# Patient Record
Sex: FEMALE | Race: WHITE | NOT HISPANIC OR LATINO | Employment: FULL TIME | URBAN - METROPOLITAN AREA
[De-identification: names, ages, dates, MRNs, and addresses within clinical notes are randomized per-mention and may not be internally consistent; named-entity substitution may affect disease eponyms.]

---

## 2017-02-20 ENCOUNTER — OFFICE VISIT (OUTPATIENT)
Dept: URGENT CARE | Facility: PHYSICIAN GROUP | Age: 22
End: 2017-02-20
Payer: COMMERCIAL

## 2017-02-20 ENCOUNTER — HOSPITAL ENCOUNTER (OUTPATIENT)
Facility: MEDICAL CENTER | Age: 22
End: 2017-02-20
Attending: PHYSICIAN ASSISTANT
Payer: COMMERCIAL

## 2017-02-20 VITALS
DIASTOLIC BLOOD PRESSURE: 70 MMHG | WEIGHT: 152 LBS | HEART RATE: 70 BPM | SYSTOLIC BLOOD PRESSURE: 116 MMHG | RESPIRATION RATE: 18 BRPM | TEMPERATURE: 98.8 F | OXYGEN SATURATION: 97 %

## 2017-02-20 DIAGNOSIS — J06.9 VIRAL UPPER RESPIRATORY TRACT INFECTION WITH COUGH: ICD-10-CM

## 2017-02-20 DIAGNOSIS — J02.9 SORE THROAT: ICD-10-CM

## 2017-02-20 LAB
INT CON NEG: NORMAL
INT CON POS: NORMAL
S PYO AG THROAT QL: NORMAL

## 2017-02-20 PROCEDURE — 87880 STREP A ASSAY W/OPTIC: CPT | Performed by: PHYSICIAN ASSISTANT

## 2017-02-20 PROCEDURE — 87070 CULTURE OTHR SPECIMN AEROBIC: CPT

## 2017-02-20 PROCEDURE — 99214 OFFICE O/P EST MOD 30 MIN: CPT | Performed by: PHYSICIAN ASSISTANT

## 2017-02-20 RX ORDER — BENZONATATE 100 MG/1
200 CAPSULE ORAL 3 TIMES DAILY PRN
Qty: 30 CAP | Refills: 0 | Status: SHIPPED | OUTPATIENT
Start: 2017-02-20 | End: 2017-03-06

## 2017-02-20 RX ORDER — IBUPROFEN 200 MG
200 TABLET ORAL EVERY 6 HOURS PRN
COMMUNITY

## 2017-02-20 ASSESSMENT — ENCOUNTER SYMPTOMS
MUSCULOSKELETAL NEGATIVE: 1
PSYCHIATRIC NEGATIVE: 1
HEADACHES: 1
NAUSEA: 1
COUGH: 0
CARDIOVASCULAR NEGATIVE: 1
DIZZINESS: 0
SORE THROAT: 1

## 2017-02-20 NOTE — PROGRESS NOTES
Subjective:      Jackelyn BENAVIDEZ is a 21 y.o. female who presents with Pharyngitis            Pharyngitis   Associated symptoms include headaches. Pertinent negatives include no coughing.     Chief Complaint   Patient presents with   • Pharyngitis     x2 days no voice       HPI:  Jackelyn BENAVIDEZ is a 21 y.o. female who presents with sore throat and loss of voice x 2 days.  Friend had cough.  No runny nose.  Slight dry cough.  Very painful to swallow.  No fever.  Has tried ibuprofen last night.  She had a HA last night.  Has been very tired.  Patient denies SOB, chest pain, palpitations, fever, chills, or v/d.      No past medical history on file.    No past surgical history on file.    No family history on file.    Social History     Social History   • Marital Status:      Spouse Name: N/A   • Number of Children: N/A   • Years of Education: N/A     Occupational History   • Not on file.     Social History Main Topics   • Smoking status: Not on file   • Smokeless tobacco: Not on file   • Alcohol Use: Not on file   • Drug Use: Not on file   • Sexual Activity: Not on file     Other Topics Concern   • Not on file     Social History Narrative   • No narrative on file         Current outpatient prescriptions:   •  ibuprofen, 200 mg, Oral, Q6HRS PRN    No Known Allergies         Review of Systems   Constitutional: Positive for malaise/fatigue.   HENT: Positive for sore throat.    Respiratory: Negative for cough.    Cardiovascular: Negative.    Gastrointestinal: Positive for nausea.   Genitourinary: Negative.    Musculoskeletal: Negative.    Skin: Negative.    Neurological: Positive for headaches. Negative for dizziness.   Endo/Heme/Allergies: Negative.    Psychiatric/Behavioral: Negative.           Objective:     /70 mmHg  Pulse 70  Temp(Src) 37.1 °C (98.8 °F)  Resp 18  Wt 68.947 kg (152 lb)  SpO2 97%     Physical Exam       Nursing note and Vitals Reviewed.    Constitutional:   Appropriately groomed,  pleasant affect, well nourished, in NAD.    Head:   Normocephalic, atraumatic.    Eyes:   PERRLA, EOM's full, sclera white, conjunctiva not erythematous, and medial canthus without exudate bilaterally.    Ears:  Auricle and tragus non-tender to manipulation.  No pre-auricular lymphadenopathy or mastoid ttp.  EACs with mild cerumen bilaterally, not erythematous.  TM’s pearly gray with cone of light present and umbo and malleolus visible bilaterally.  No bulging or fluid bubbles present in middle ear.  Hearing grossly intact to voice.    Nose:  Nares patent bilaterally.  Nasal mucosa edematous with white rhinorrhea bilaterally. Sinuses not tender to percussion.    Throat:  Dentition wnl, mucosa moist without lesions.  Oropharynx significantly erythematous, with enlargement of the palatine tonsils bilaterally without exudates.    Tonsillith present right tonsil. Mild post nasal drainage present.  Soft palate rises symmetrically bilaterally and uvula midline.      Neck: Neck supple, with moderate anterior lymphadenopathy that is soft and mobile to palpation. Thyroid non-palpable without tenderness or nodules. No supraclavicular lymphadenopathy.    Lungs:  Respiratory effort not labored without accessory muscle use.  Lungs clear to auscultation bilaterally without wheezes, rales, or rhonchi.    Heart:  RRR, without murmurs rubs or gallops.  Radial and dorsalis pedis pulse 2+ bilaterally.  No LE edema.    Musculoskeletal:  Gait non-antalgic with a narrow base.    Derm:  Skin without rashes or lesions with good turgor pressure.      Psychiatric:  Mood, affect, and judgement appropriate.        Assessment/Plan:     1. Sore throat    - POCT Rapid Strep A  - benzonatate (TESSALON) 100 MG Cap; Take 2 Caps by mouth 3 times a day as needed for Cough for up to 14 days.  Dispense: 30 Cap; Refill: 0  - CULTURE THROAT; Future    2. Viral upper respiratory tract infection with cough  Patient presents with suspected viral upper  respiratory infection ×2 days with non-productive cough, mild coryza, and erythematous oropharynx. Rapid strep negative. Backup culture obtained to rule out group B strep. Plan to treat accordingly based off results. We'll call patient with results. Prescribed Tessalon Perles. Recommended nasal saline irrigation and licorice containing teas for sore throat. Work note provided.    Patient was in agreement with this treatment plan and seemed to understand without barriers. All questions were encouraged and answered.  Reviewed signs and symptoms of when to seek emergency medical care.     Please note that this dictation was created using voice recognition software.  I have made every reasonable attempt to correct obvious errors, but I expect there are errors of marleen and possibly content that I did not discover before finalizing the note.     - benzonatate (TESSALON) 100 MG Cap; Take 2 Caps by mouth 3 times a day as needed for Cough for up to 14 days.  Dispense: 30 Cap; Refill: 0  - CULTURE THROAT; Future

## 2017-02-20 NOTE — MR AVS SNAPSHOT
Jackelyn BENAVIDEZ   2017 8:15 AM   Office Visit   MRN: [id number]    Department:  [facility]   Dept Phone:  [phone]    Description:  Female : 1995   Provider:  Dustin Maria PA-C           Reason for Visit     Pharyngitis x2 days no voice      Allergies as of 2017     No Known Allergies      You were diagnosed with     Sore throat   [364810]       Viral upper respiratory tract infection with cough   [897862]         Vital Signs     Blood Pressure Pulse Temperature Respirations Weight Oxygen Saturation    116/70 mmHg 70 37.1 °C (98.8 °F) 18 68.947 kg (152 lb) 97%      Basic Information     Date Of Birth Sex Race Ethnicity Preferred Language    1995 Female White Non- English      Health Maintenance        Date Due Completion Dates    IMM HEP B VACCINE (1 of 3 - Primary Series) 1995 ---    IMM HEP A VACCINE (1 of 2 - Standard Series) 10/17/1998 ---    IMM HPV VACCINE (1 of 3 - Female 3 Dose Series) 10/17/2008 ---    IMM VARICELLA (CHICKENPOX) VACCINE (1 of 2 - 2 Dose Adolescent Series) 10/17/2010 ---    IMM MENINGOCOCCAL VACCINE (MCV4) (1 of 1) 10/17/2013 ---    IMM INFLUENZA (1) 2016 ---    IMM DTaP/Tdap/Td Vaccine (1 - Tdap) 10/17/2016 ---            Results     POCT Rapid Strep A      Component    Rapid Strep Screen    NEG    Internal Control Positive    Valid    Internal Control Negative    Valid                        Current Immunizations     No immunizations on file.      Below and/or attached are the medications your provider expects you to take. Review all of your home medications and newly ordered medications with your provider and/or pharmacist. Follow medication instructions as directed by your provider and/or pharmacist. Please keep your medication list with you and share with your provider. Update the information when medications are discontinued, doses are changed, or new medications (including over-the-counter products) are added; and carry  medication information at all times in the event of emergency situations     Allergies:  No Known Allergies          Medications  Valid as of: February 20, 2017 -  8:59 AM    Generic Name Brand Name Tablet Size Instructions for use    Benzonatate (Cap) TESSALON 100 MG Take 2 Caps by mouth 3 times a day as needed for Cough for up to 14 days.        Ibuprofen (Tab) MOTRIN 200 MG Take 200 mg by mouth every 6 hours as needed.        .                 Medicines prescribed today were sent to:     [facility], [place] - [address]    [address] [place] [address]    Phone: [phone] Fax: [phone]    Open 24 Hours?: No    [facility], [place] - [address]    [address] [address]    Phone: [phone] Fax: [phone]    Open 24 Hours?: Yes      Medication refill instructions:       If your prescription bottle indicates you have medication refills left, it is not necessary to call your provider’s office. Please contact your pharmacy and they will refill your medication.    If your prescription bottle indicates you do not have any refills left, you may request refills at any time through one of the following ways: The online [employer] system (except Urgent Care), by calling your provider’s office, or by asking your pharmacy to contact your provider’s office with a refill request. Medication refills are processed only during regular business hours and may not be available until the next business day. Your provider may request additional information or to have a follow-up visit with you prior to refilling your medication.   *Please Note: Medication refills are assigned a new Rx number when refilled electronically. Your pharmacy may indicate that no refills were authorized even though a new prescription for the same medication is available at the pharmacy. Please request the medicine by name with the pharmacy before  contacting your provider for a refill.        Your To Do List     Future Labs/Procedures Complete By Expires    CULTURE THROAT  As directed 2/20/2018      Instructions    Yogi: licorice + mint to help with sore throat.  Continue with ibuprofen with food, 400mg every 6 hours.  Tessalon perles for cough.  Salt water gargles.  Nasal irrigation: Ocean or Tahir med sinus rinse.  Return if worsening: fever of 101 or higher, thick yellow mucous, worsening cough.          Hubba Access Code: [email or web address]  Expires: 3/22/2017  8:59 AM    Your email address is not on file at [facility].  Email Addresses are required for you to sign up for Hubba, please contact [phone] to verify your personal information and to provide your email address prior to attempting to register for Hubba.    Jackelyn BENAVIDEZ  [address] [place] [address]    Hubba  A secure, online tool to manage your health information     [facility]’s Hubba® is a secure, online tool that connects you to your personalized health information from the privacy of your home -- day or night - making it very easy for you to manage your healthcare. Once the activation process is completed, you can even access your medical information using the Hubba emre, which is available for free in the Apple Emre store or Google Play store.     To learn more about Hubba, visit [email or web address]    There are two levels of access available (as shown below):   My Chart Features  [facility] Primary Care Doctor [facility]  Specialists [facility]  Urgent  Care Non-[facility] Primary Care Doctor   Email your healthcare team securely and privately 24/7 X X X    Manage appointments: schedule your next appointment; view details of past/upcoming appointments X      Request prescription refills. X      View recent personal medical records, including lab and immunizations X X X X   View health record, including health history, allergies, medications X X X X   Read reports about your  outpatient visits, procedures, consult and ER notes X X X X   See your discharge summary, which is a recap of your hospital and/or ER visit that includes your diagnosis, lab results, and care plan X X  X     How to register for Untangle:  Once your e-mail address has been verified, follow the following steps to sign up for Untangle.     1. Go to  [email or web address]  2. Click on the Sign Up Now box, which takes you to the New Member Sign Up page. You will need to provide the following information:  a. Enter your Untangle Access Code exactly as it appears at the top of this page. (You will not need to use this code after you’ve completed the sign-up process. If you do not sign up before the expiration date, you must request a new code.)   b. Enter your date of birth.   c. Enter your home email address.   d. Click Submit, and follow the next screen’s instructions.  3. Create a Untangle ID. This will be your Untangle login ID and cannot be changed, so think of one that is secure and easy to remember.  4. Create a [employer] password. You can change your password at any time.  5. Enter your Password Reset Question and Answer. This can be used at a later time if you forget your password.   6. Enter your e-mail address. This allows you to receive e-mail notifications when new information is available in Untangle.  7. Click Sign Up. You can now view your health information.    For assistance activating your Untangle account, call [phone]

## 2017-02-20 NOTE — Clinical Note
February 20, 2017         Patient: Jackelyn BENAVIDEZ   YOB: 1995   Date of Visit: 2/20/2017           To Whom it May Concern:    Jackelyn BENAVIDEZ was seen in my clinic on 2/20/2017. Please excuse her from work today.    If you have any questions or concerns, please don't hesitate to call.        Sincerely,           Dustin Maria PA-C  Electronically Signed

## 2017-02-20 NOTE — PATIENT INSTRUCTIONS
Yogi: licorice + mint to help with sore throat.  Continue with ibuprofen with food, 400mg every 6 hours.  Tessalon perles for cough.  Salt water gargles.  Nasal irrigation: Ocean or Tahir med sinus rinse.  Return if worsening: fever of 101 or higher, thick yellow mucous, worsening cough.

## 2017-02-21 DIAGNOSIS — J06.9 VIRAL UPPER RESPIRATORY TRACT INFECTION WITH COUGH: ICD-10-CM

## 2017-02-21 DIAGNOSIS — J02.9 SORE THROAT: ICD-10-CM

## 2017-02-21 LAB
AMBIGUOUS DTTM AMBI4: NORMAL
SIGNIFICANT IND 70042: NORMAL
SOURCE SOURCE: NORMAL

## 2017-02-23 LAB
BACTERIA SPEC RESP CULT: NORMAL
SIGNIFICANT IND 70042: NORMAL
SOURCE SOURCE: NORMAL

## 2017-02-27 ENCOUNTER — TELEPHONE (OUTPATIENT)
Dept: URGENT CARE | Facility: CLINIC | Age: 22
End: 2017-02-27

## 2017-02-27 NOTE — TELEPHONE ENCOUNTER
Spoke to patient.  Informed her of negative throat culture.  Patient is doing better.  Sore throat resolved by Friday last week.  Now with clear rhinorrhea that is managed with OTC cough medicines.  All questions encouraged and answered.

## 2017-05-09 ENCOUNTER — OFFICE VISIT (OUTPATIENT)
Dept: URGENT CARE | Facility: PHYSICIAN GROUP | Age: 22
End: 2017-05-09
Payer: COMMERCIAL

## 2017-05-09 VITALS
RESPIRATION RATE: 18 BRPM | TEMPERATURE: 98.4 F | WEIGHT: 150 LBS | DIASTOLIC BLOOD PRESSURE: 70 MMHG | OXYGEN SATURATION: 98 % | HEART RATE: 76 BPM | SYSTOLIC BLOOD PRESSURE: 112 MMHG

## 2017-05-09 DIAGNOSIS — J02.9 EXUDATIVE PHARYNGITIS: Primary | ICD-10-CM

## 2017-05-09 LAB
INT CON NEG: NEGATIVE
INT CON POS: POSITIVE
S PYO AG THROAT QL: NEGATIVE

## 2017-05-09 PROCEDURE — 87880 STREP A ASSAY W/OPTIC: CPT | Performed by: PHYSICIAN ASSISTANT

## 2017-05-09 PROCEDURE — 99214 OFFICE O/P EST MOD 30 MIN: CPT | Performed by: PHYSICIAN ASSISTANT

## 2017-05-09 RX ORDER — AMOXICILLIN AND CLAVULANATE POTASSIUM 875; 125 MG/1; MG/1
1 TABLET, FILM COATED ORAL 2 TIMES DAILY
Qty: 14 TAB | Refills: 0 | Status: SHIPPED | OUTPATIENT
Start: 2017-05-09 | End: 2017-05-16

## 2017-05-09 RX ORDER — DROSPIRENONE AND ETHINYL ESTRADIOL 0.03MG-3MG
1 KIT ORAL DAILY
COMMUNITY
End: 2020-09-27

## 2017-05-09 NOTE — PATIENT INSTRUCTIONS
"Strep Throat  Strep throat is an infection of the throat caused by a bacteria named Streptococcus pyogenes. Your health care provider may call the infection streptococcal \"tonsillitis\" or \"pharyngitis\" depending on whether there are signs of inflammation in the tonsils or back of the throat. Strep throat is most common in children aged 5-15 years during the cold months of the year, but it can occur in people of any age during any season. This infection is spread from person to person (contagious) through coughing, sneezing, or other close contact.  SIGNS AND SYMPTOMS   · Fever or chills.  · Painful, swollen, red tonsils or throat.  · Pain or difficulty when swallowing.  · White or yellow spots on the tonsils or throat.  · Swollen, tender lymph nodes or \"glands\" of the neck or under the jaw.  · Red rash all over the body (rare).  DIAGNOSIS   Many different infections can cause the same symptoms. A test must be done to confirm the diagnosis so the right treatment can be given. A \"rapid strep test\" can help your health care provider make the diagnosis in a few minutes. If this test is not available, a light swab of the infected area can be used for a throat culture test. If a throat culture test is done, results are usually available in a day or two.  TREATMENT   Strep throat is treated with antibiotic medicine.  HOME CARE INSTRUCTIONS   · Gargle with 1 tsp of salt in 1 cup of warm water, 3-4 times per day or as needed for comfort.  · Family members who also have a sore throat or fever should be tested for strep throat and treated with antibiotics if they have the strep infection.  · Make sure everyone in your household washes their hands well.  · Do not share food, drinking cups, or personal items that could cause the infection to spread to others.  · You may need to eat a soft food diet until your sore throat gets better.  · Drink enough water and fluids to keep your urine clear or pale yellow. This will help prevent " dehydration.  · Get plenty of rest.  · Stay home from school, day care, or work until you have been on antibiotics for 24 hours.  · Take medicines only as directed by your health care provider.  · Take your antibiotic medicine as directed by your health care provider. Finish it even if you start to feel better.  SEEK MEDICAL CARE IF:   · The glands in your neck continue to enlarge.  · You develop a rash, cough, or earache.  · You cough up green, yellow-brown, or bloody sputum.  · You have pain or discomfort not controlled by medicines.  · Your problems seem to be getting worse rather than better.  · You have a fever.  SEEK IMMEDIATE MEDICAL CARE IF:   · You develop any new symptoms such as vomiting, severe headache, stiff or painful neck, chest pain, shortness of breath, or trouble swallowing.  · You develop severe throat pain, drooling, or changes in your voice.  · You develop swelling of the neck, or the skin on the neck becomes red and tender.  · You develop signs of dehydration, such as fatigue, dry mouth, and decreased urination.  · You become increasingly sleepy, or you cannot wake up completely.  MAKE SURE YOU:  · Understand these instructions.  · Will watch your condition.  · Will get help right away if you are not doing well or get worse.     This information is not intended to replace advice given to you by your health care provider. Make sure you discuss any questions you have with your health care provider.     Document Released: 12/15/2001 Document Revised: 01/08/2016 Document Reviewed: 04/11/2016  [employer] Interactive Patient Education ©2016 [employer]

## 2017-05-09 NOTE — MR AVS SNAPSHOT
Jackelyn BESTLAKSHMI   2017 10:45 AM   Office Visit   MRN: [id number]    Department:  [facility]   Dept Phone:  [phone]    Description:  Female : 1995   Provider:  Hieu Lazo PA-C           Reason for Visit     Pharyngitis x1day      Allergies as of 2017     No Known Allergies      You were diagnosed with     Exudative pharyngitis   [[id number]]  -  Primary       Vital Signs     Blood Pressure Pulse Temperature Respirations Weight Oxygen Saturation    112/70 mmHg 76 36.9 °C (98.4 °F) 18 68.04 kg (150 lb) 98%    Smoking Status                   Never Smoker            Basic Information     Date Of Birth Sex Race Ethnicity Preferred Language    1995 Female White Non- English      Health Maintenance        Date Due Completion Dates    IMM HEP B VACCINE (1 of 3 - Primary Series) 1995 ---    IMM HEP A VACCINE (1 of 2 - Standard Series) 10/17/1998 ---    IMM HPV VACCINE (1 of 3 - Female 3 Dose Series) 10/17/2008 ---    IMM VARICELLA (CHICKENPOX) VACCINE (1 of 2 - 2 Dose Adolescent Series) 10/17/2010 ---    IMM MENINGOCOCCAL VACCINE (MCV4) (1 of 1) 10/17/2013 ---    IMM DTaP/Tdap/Td Vaccine (1 - Tdap) 10/17/2016 ---            Results     POCT Rapid Strep A      Component    Rapid Strep Screen    Negative    Internal Control Positive    Positive    Internal Control Negative    Negative                        Current Immunizations     No immunizations on file.      Below and/or attached are the medications your provider expects you to take. Review all of your home medications and newly ordered medications with your provider and/or pharmacist. Follow medication instructions as directed by your provider and/or pharmacist. Please keep your medication list with you and share with your provider. Update the information when medications are discontinued, doses are changed, or new medications (including over-the-counter products) are added; and carry medication information at  all times in the event of emergency situations     Allergies:  No Known Allergies          Medications  Valid as of: May 09, 2017 - 12:12 PM    Generic Name Brand Name Tablet Size Instructions for use    Alum & Mag Hydroxide-Simeth (MAALOX PLUS-BENADRYL-XYLOCAINE) maalox plus-benadryl-xylocaine  Take 15 mL by mouth every 6 hours as needed for up to 5 days.        Amoxicillin-Pot Clavulanate (Tab) AUGMENTIN 875-125 MG Take 1 Tab by mouth 2 times a day for 7 days.        Drospirenone-Ethinyl Estradiol (Tab) MASON 3-0.03 MG Take 1 Tab by mouth every day.        Ibuprofen (Tab) MOTRIN 200 MG Take 200 mg by mouth every 6 hours as needed.        .                 Medicines prescribed today were sent to:     [id number] [place] - [address]    [address] [address]    Phone: [phone] Fax: [phone]    Open 24 Hours?: No    [facility] [address] [place] [address] - [address]    [address] [address]    Phone: [phone] Fax: [phone]    Open 24 Hours?: Yes      Medication refill instructions:       If your prescription bottle indicates you have medication refills left, it is not necessary to call your provider’s office. Please contact your pharmacy and they will refill your medication.    If your prescription bottle indicates you do not have any refills left, you may request refills at any time through one of the following ways: The online Clicks2Customers system (except Urgent Care), by calling your provider’s office, or by asking your pharmacy to contact your provider’s office with a refill request. Medication refills are processed only during regular business hours and may not be available until the next business day. Your provider may request additional information or to have a follow-up visit with you prior to refilling your medication.   *Please Note: Medication refills are assigned a new Rx number when refilled  "electronically. Your pharmacy may indicate that no refills were authorized even though a new prescription for the same medication is available at the pharmacy. Please request the medicine by name with the pharmacy before contacting your provider for a refill.        Instructions    Strep Throat  Strep throat is an infection of the throat caused by a bacteria named Streptococcus pyogenes. Your health care provider may call the infection streptococcal \"tonsillitis\" or \"pharyngitis\" depending on whether there are signs of inflammation in the tonsils or back of the throat. Strep throat is most common in children aged 5-15 years during the cold months of the year, but it can occur in people of any age during any season. This infection is spread from person to person (contagious) through coughing, sneezing, or other close contact.  SIGNS AND SYMPTOMS   · Fever or chills.  · Painful, swollen, red tonsils or throat.  · Pain or difficulty when swallowing.  · White or yellow spots on the tonsils or throat.  · Swollen, tender lymph nodes or \"glands\" of the neck or under the jaw.  · Red rash all over the body (rare).  DIAGNOSIS   Many different infections can cause the same symptoms. A test must be done to confirm the diagnosis so the right treatment can be given. A \"rapid strep test\" can help your health care provider make the diagnosis in a few minutes. If this test is not available, a light swab of the infected area can be used for a throat culture test. If a throat culture test is done, results are usually available in a day or two.  TREATMENT   Strep throat is treated with antibiotic medicine.  HOME CARE INSTRUCTIONS   · Gargle with 1 tsp of salt in 1 cup of warm water, 3-4 times per day or as needed for comfort.  · Family members who also have a sore throat or fever should be tested for strep throat and treated with antibiotics if they have the strep infection.  · Make sure everyone in your household washes their hands " well.  · Do not share food, drinking cups, or personal items that could cause the infection to spread to others.  · You may need to eat a soft food diet until your sore throat gets better.  · Drink enough water and fluids to keep your urine clear or pale yellow. This will help prevent dehydration.  · Get plenty of rest.  · Stay home from school, day care, or work until you have been on antibiotics for 24 hours.  · Take medicines only as directed by your health care provider.  · Take your antibiotic medicine as directed by your health care provider. Finish it even if you start to feel better.  SEEK MEDICAL CARE IF:   · The glands in your neck continue to enlarge.  · You develop a rash, cough, or earache.  · You cough up green, yellow-brown, or bloody sputum.  · You have pain or discomfort not controlled by medicines.  · Your problems seem to be getting worse rather than better.  · You have a fever.  SEEK IMMEDIATE MEDICAL CARE IF:   · You develop any new symptoms such as vomiting, severe headache, stiff or painful neck, chest pain, shortness of breath, or trouble swallowing.  · You develop severe throat pain, drooling, or changes in your voice.  · You develop swelling of the neck, or the skin on the neck becomes red and tender.  · You develop signs of dehydration, such as fatigue, dry mouth, and decreased urination.  · You become increasingly sleepy, or you cannot wake up completely.  MAKE SURE YOU:  · Understand these instructions.  · Will watch your condition.  · Will get help right away if you are not doing well or get worse.     This information is not intended to replace advice given to you by your health care provider. Make sure you discuss any questions you have with your health care provider.     Document Released: 12/15/2001 Document Revised: 01/08/2016 Document Reviewed: 04/11/2016  [employer] Interactive Patient Education ©2016 Elsevier Inc.            [employer] Eng[employer] Access Code: [email or web address]  Expires:  6/8/2017 10:58 AM    Your email address is not on file at [facility].  Email Addresses are required for you to sign up for Rock My World, please contact [phone] to verify your personal information and to provide your email address prior to attempting to register for Rock My World.    Jackelyn BENAVIDEZ  [address] [place] [address]    MyChart  A secure, online tool to manage your health information     [facility]’s Rock My World® is a secure, online tool that connects you to your personalized health information from the privacy of your home -- day or night - making it very easy for you to manage your healthcare. Once the activation process is completed, you can even access your medical information using the Rock My World emre, which is available for free in the Apple Emre store or Google Play store.     To learn more about Rock My World, visit [email or web address]    There are two levels of access available (as shown below):   My Chart Features  [facility] Primary Care Doctor [facility]  Specialists [facility]  Urgent  Care Non-[facility] Primary Care Doctor   Email your healthcare team securely and privately 24/7 X X X    Manage appointments: schedule your next appointment; view details of past/upcoming appointments X      Request prescription refills. X      View recent personal medical records, including lab and immunizations X X X X   View health record, including health history, allergies, medications X X X X   Read reports about your outpatient visits, procedures, consult and ER notes X X X X   See your discharge summary, which is a recap of your hospital and/or ER visit that includes your diagnosis, lab results, and care plan X X  X     How to register for RUNt:  Once your e-mail address has been verified, follow the following steps to sign up for RUNt.     1. Go to  [email or web address]  2. Click on the Sign Up Now box, which takes you to the New Member Sign Up page. You will need to provide the following information:  a. Enter  your [employer] Access Code exactly as it appears at the top of this page. (You will not need to use this code after you’ve completed the sign-up process. If you do not sign up before the expiration date, you must request a new code.)   b. Enter your date of birth.   c. Enter your home email address.   d. Click Submit, and follow the next screen’s instructions.  3. Create a [employer] ID. This will be your SeeJay login ID and cannot be changed, so think of one that is secure and easy to remember.  4. Create a [employer] password. You can change your password at any time.  5. Enter your Password Reset Question and Answer. This can be used at a later time if you forget your password.   6. Enter your e-mail address. This allows you to receive e-mail notifications when new information is available in SeeJay.  7. Click Sign Up. You can now view your health information.    For assistance activating your SeeJay account, call [phone]

## 2018-11-20 ENCOUNTER — OFFICE VISIT (OUTPATIENT)
Dept: URGENT CARE | Facility: PHYSICIAN GROUP | Age: 23
End: 2018-11-20
Payer: COMMERCIAL

## 2018-11-20 VITALS
SYSTOLIC BLOOD PRESSURE: 110 MMHG | DIASTOLIC BLOOD PRESSURE: 66 MMHG | TEMPERATURE: 98.4 F | HEIGHT: 64 IN | BODY MASS INDEX: 28.85 KG/M2 | RESPIRATION RATE: 16 BRPM | WEIGHT: 169 LBS | OXYGEN SATURATION: 97 % | HEART RATE: 78 BPM

## 2018-11-20 DIAGNOSIS — R59.1 LYMPHADENOPATHY: ICD-10-CM

## 2018-11-20 PROCEDURE — 99214 OFFICE O/P EST MOD 30 MIN: CPT | Performed by: FAMILY MEDICINE

## 2018-11-20 RX ORDER — CLINDAMYCIN HYDROCHLORIDE 300 MG/1
300 CAPSULE ORAL 3 TIMES DAILY
Qty: 21 CAP | Refills: 0 | Status: SHIPPED | OUTPATIENT
Start: 2018-11-20 | End: 2018-11-27

## 2018-11-20 ASSESSMENT — ENCOUNTER SYMPTOMS
FOCAL WEAKNESS: 0
EYE DISCHARGE: 0
MYALGIAS: 0
WEIGHT LOSS: 0
SENSORY CHANGE: 0
SINUS PAIN: 0
SORE THROAT: 0
EYE REDNESS: 0

## 2018-11-20 NOTE — PROGRESS NOTES
"Subjective:      Jackelyn BENAVIDEZ is a 23 y.o. female who presents with Neck Pain (swollen behind R ear )            4 days right lateral neck swelling inferior to ear and angle of jaw. Tender. Slightly improved today. OTC ibuprofen? If helping. Pain is worse with swallow. New pinna piercing 1.5 wk ago.  There is been slight discharge from the piercing site.  No earache. No recent swimming .No other aggravating or alleviating factors.          Review of Systems   Constitutional: Negative for malaise/fatigue and weight loss.   HENT: Negative for sinus pain and sore throat.    Eyes: Negative for discharge and redness.   Musculoskeletal: Negative for joint pain and myalgias.   Skin: Negative for itching and rash.   Neurological: Negative for sensory change and focal weakness.     .  Medications, Allergies, and current problem list reviewed today in Epic       Objective:     /66   Pulse 78   Temp 36.9 °C (98.4 °F)   Resp 16   Ht 1.626 m (5' 4\")   Wt 76.7 kg (169 lb)   SpO2 97%   BMI 29.01 kg/m²      Physical Exam   Constitutional: She is oriented to person, place, and time. She appears well-developed and well-nourished. No distress.   HENT:   Head: Normocephalic and atraumatic.   Left Ear: External ear normal.   Mouth/Throat: Oropharynx is clear and moist.   Tender right submandibular lymphadenopathy.  Right pinna piercing is mildly inflamed with some associated crusting.  No fluctuance.   Eyes: Conjunctivae are normal.   Cardiovascular: Normal rate, regular rhythm and normal heart sounds.    Pulmonary/Chest: Effort normal and breath sounds normal.   Neurological: She is alert and oriented to person, place, and time.   Skin: Skin is warm and dry. No rash noted.               Assessment/Plan:     1. Lymphadenopathy  clindamycin (CLEOCIN) 300 MG Cap     Differential diagnosis, natural history, supportive care, and indications for immediate follow-up discussed at length.     Suspect inflammation/infection " associated with recent piercing.  Will try a course of antibiotics.  She will contact me if symptoms persist or worsen.

## 2018-11-20 NOTE — LETTER
November 20, 2018         Patient: Jackelyn BENAVIDEZ   YOB: 1995   Date of Visit: 11/20/2018           To Whom it May Concern:    Jackelyn BENAVIDEZ was seen in my clinic on 11/20/2018. Please excuse today.       Sincerely,           Mervin Benton M.D.  Electronically Signed

## 2020-02-26 NOTE — PROGRESS NOTES
----- Message from Tori Gonsalo sent at 2/26/2020  7:26 AM CST -----  Contact: Self   Type: Patient Call Back    Who called: Self     What is the request in detail:patient would like to speak with you she had a recent ED visit to [facility] and need to be seen by Friday. Please call     Can the clinic reply by [facility]? No     Would the patient rather a call back or a response via [facility]?  Call     Best call back number:[phone]    Additional Information: if she can't see Dr. Peguero she would like to know if Dr. Peguero can recommend someone in the [place], [place], [place], area . Please call      Subjective:      Pt is a 21 y.o. female who presents with Pharyngitis            Pharyngitis   This is a new problem. The current episode started yesterday. The problem has been gradually worsening. The pain is worse on the left side. There has been no fever. The pain is at a severity of 7/10. The pain is moderate. She has had no exposure to strep or mono. She has tried cool liquids and gargles for the symptoms. The treatment provided mild relief.   PT presents to  clinic today complaining of sore throat,  fatigue, runny nose. PT denies CP, SOB, NVD, abdominal pain, joint pain. PT states these symptoms began around 2 days ago and that the pt's family has been sick on and off for the last week. PT states the pain is a 7/10 with swallowing, aching in nature and worse at night. Pt has not taken any medications for this condition. The pt's medication list, problem list, and allergies have been evaluated and reviewed during today's visit.      PMH:  Negative per pt.      PSH:  Negative per pt.      Fam Hx:  Father alive and well with no major medical issues  Mother alive and well with no major medical  Issues        Soc HX:  Social History     Social History   • Marital Status:      Spouse Name: N/A   • Number of Children: N/A   • Years of Education: N/A     Occupational History   • Not on file.     Social History Main Topics   • Smoking status: Never Smoker    • Smokeless tobacco: Not on file   • Alcohol Use: Not on file   • Drug Use: Not on file   • Sexual Activity: Not on file     Other Topics Concern   • Not on file     Social History Narrative         Medications:    Current outpatient prescriptions:   •  drospirenone-ethinyl estradiol (HOSSEIN) 3-0.03 MG per tablet, Take 1 Tab by mouth every day., Disp: , Rfl:   •  amoxicillin-clavulanate (AUGMENTIN) 875-125 MG Tab, Take 1 Tab by mouth 2 times a day for 7 days., Disp: 14 Tab, Rfl: 0  •  Alum & Mag Hydroxide-Simeth (MAALOX PLUS-BENADRYL-XYLOCAINE), Take 15 mL  by mouth every 6 hours as needed for up to 5 days., Disp: 300 mL, Rfl: 0  •  ibuprofen (MOTRIN) 200 MG Tab, Take 200 mg by mouth every 6 hours as needed., Disp: , Rfl:       Allergies:  Review of patient's allergies indicates no known allergies.        ROS    Parent/mother is the historian  Constitutional: Positive for  malaise/fatigue.   HENT: Positive for congestion and redness in throat. Negative for ear pulling.    Eyes: Negative for redness  Respiratory: Neg for cough  Cardiac: No hx of irregular heartbeat per parent  Gastrointestinal: Negative for vomiting or diarrhea  Skin: Negative for itching and rash.   Neurological: Negative for head pain  Endo/Heme/Allergies: Does not bruise/bleed easily.   Psychiatric/Behavioral: Negative for behavioral issues       Objective:     /70 mmHg  Pulse 76  Temp(Src) 36.9 °C (98.4 °F)  Resp 18  Wt 68.04 kg (150 lb)  SpO2 98%     Physical Exam       Constitutional: PT is oriented to person, place, and time. PT appears well-developed and well-nourished. No distress.   HENT:   Head: Normocephalic and atraumatic.   Right Ear: Hearing, tympanic membrane, external ear and ear canal normal.   Left Ear: Hearing, tympanic membrane, external ear and ear canal normal.   Nose: Mucosal edema, rhinorrhea and sinus tenderness present. Right sinus exhibits frontal sinus tenderness. Left sinus exhibits frontal sinus tenderness.   Mouth/Throat: Uvula is midline. Mucous membranes are pale. Posterior oropharyngeal edema and posterior oropharyngeal erythema with exudate noted on exam   Eyes: Conjunctivae normal and EOM are normal. Pupils are equal, round, and reactive to light.   Neck: Normal range of motion. Neck supple. No thyromegaly present.   Cardiovascular: Normal rate, regular rhythm, normal heart sounds and intact distal pulses.  Exam reveals no gallop and no friction rub.    No murmur heard.  Pulmonary/Chest: Effort normal and breath sounds normal. No respiratory distress. PT  has no wheezes. PT has no rales. PT exhibits no tenderness.   Abdominal: Soft. Bowel sounds are normal. PT exhibits no distension and no mass. There is no tenderness. There is no rebound and no guarding.   Musculoskeletal: Normal range of motion. PT exhibits no edema and no tenderness.   Lymphadenopathy:     PT has no cervical adenopathy.   Neurological: PT is alert and oriented to person, place, and time. PT displays normal reflexes. No cranial nerve deficit. PT exhibits normal muscle tone. Coordination normal.   Skin: Skin is warm and dry. No rash noted. No erythema.   Psychiatric: PT has a normal mood and affect. PT behavior is normal. Judgment and thought content normal.        Assessment/Plan:     1. Exudative pharyngitis  Back-up abx if symptoms do not improve in 2-3 days. PT on clinical presentation has exudate on oropharynx and it's possible beyond the strep A testing that this could be a different type of strep (C/G) or A. haemolyticum     - amoxicillin-clavulanate (AUGMENTIN) 875-125 MG Tab; Take 1 Tab by mouth 2 times a day for 7 days.  Dispense: 14 Tab; Refill: 0  - Alum & Mag Hydroxide-Simeth (MAALOX PLUS-BENADRYL-XYLOCAINE); Take 15 mL by mouth every 6 hours as needed for up to 5 days.  Dispense: 300 mL; Refill: 0  - POC strep-->NEG    Rest, fluids encouraged.  OTC decongestant for congestion  AVS with medical info given.  Pt was in full understanding and agreement with the plan.  Follow-up as needed if symptoms worsen or fail to improve.

## 2020-09-27 ENCOUNTER — HOSPITAL ENCOUNTER (OUTPATIENT)
Facility: MEDICAL CENTER | Age: 25
End: 2020-09-27
Attending: FAMILY MEDICINE
Payer: COMMERCIAL

## 2020-09-27 ENCOUNTER — OFFICE VISIT (OUTPATIENT)
Dept: URGENT CARE | Facility: PHYSICIAN GROUP | Age: 25
End: 2020-09-27
Payer: COMMERCIAL

## 2020-09-27 VITALS
BODY MASS INDEX: 29.7 KG/M2 | DIASTOLIC BLOOD PRESSURE: 78 MMHG | OXYGEN SATURATION: 99 % | SYSTOLIC BLOOD PRESSURE: 110 MMHG | WEIGHT: 173 LBS | TEMPERATURE: 98.1 F | HEART RATE: 87 BPM

## 2020-09-27 DIAGNOSIS — Z20.822 SUSPECTED COVID-19 VIRUS INFECTION: ICD-10-CM

## 2020-09-27 DIAGNOSIS — J02.9 PHARYNGITIS, UNSPECIFIED ETIOLOGY: ICD-10-CM

## 2020-09-27 LAB
INT CON NEG: NORMAL
INT CON POS: NORMAL
S PYO AG THROAT QL: NORMAL

## 2020-09-27 PROCEDURE — 99214 OFFICE O/P EST MOD 30 MIN: CPT | Performed by: FAMILY MEDICINE

## 2020-09-27 PROCEDURE — U0003 INFECTIOUS AGENT DETECTION BY NUCLEIC ACID (DNA OR RNA); SEVERE ACUTE RESPIRATORY SYNDROME CORONAVIRUS 2 (SARS-COV-2) (CORONAVIRUS DISEASE [COVID-19]), AMPLIFIED PROBE TECHNIQUE, MAKING USE OF HIGH THROUGHPUT TECHNOLOGIES AS DESCRIBED BY CMS-2020-01-R: HCPCS

## 2020-09-27 PROCEDURE — 87880 STREP A ASSAY W/OPTIC: CPT | Performed by: FAMILY MEDICINE

## 2020-09-27 ASSESSMENT — ENCOUNTER SYMPTOMS
SHORTNESS OF BREATH: 0
VOMITING: 0
FOCAL WEAKNESS: 0
SORE THROAT: 1
FEVER: 0

## 2020-09-27 NOTE — LETTER
September 27, 2020      To Whom It May Concern:           This is confirmation that Jackelyn BENAVIDEZ attended her scheduled appointment with Thaddeus Martinez M.D. on 9/27/20.  Patient is being tested for COVID-19 and may not return to work until test results available.  If test results were to be positive, patient would not be allowed to return to work for 10 days after symptoms started (10/7/20).  Thank you for making appropriate accommodations as they recover.           If you have any questions please do not hesitate to call me at the phone number listed below.      Sincerely,          Thaddeus Martinez M.D.  [phone]

## 2020-09-28 DIAGNOSIS — Z20.822 SUSPECTED COVID-19 VIRUS INFECTION: ICD-10-CM

## 2020-09-28 DIAGNOSIS — J02.9 PHARYNGITIS, UNSPECIFIED ETIOLOGY: ICD-10-CM

## 2020-09-28 LAB
COVID ORDER STATUS COVID19: NORMAL
SARS-COV-2 RNA RESP QL NAA+PROBE: NOTDETECTED
SPECIMEN SOURCE: NORMAL

## 2020-09-28 NOTE — PROGRESS NOTES
Subjective:     Jackelyn BENAVIDEZ is a 24 y.o. female who presents for Pharyngitis (swollen tonsils,white spots,started lastnight)    HPI  Pt presents for evaluation of a new problem   Pt with sore throat which just started last night   Sore throat is constant worse with swallowing  Sore throat is stable and not worsening  Tonsils are enlarged with white spots  Posterior pharynx is red  No fevers does not feel ill, no cough, no ear pain, no headaches, no abdominal pain    Review of Systems   Constitutional: Negative for fever.   HENT: Positive for sore throat.    Respiratory: Negative for shortness of breath.    Cardiovascular: Negative for chest pain.   Gastrointestinal: Negative for vomiting.   Skin: Negative for rash.   Neurological: Negative for focal weakness.     PMH: No chronic medical problems   MEDS:   Current Outpatient Medications:   •  ibuprofen (MOTRIN) 200 MG Tab, Take 200 mg by mouth every 6 hours as needed., Disp: , Rfl:   ALLERGIES: No Known Allergies  SURGHX: None  SOCHX:  reports that she has never smoked. She has never used smokeless tobacco.  FH: Family history was reviewed, not contributing to acute illness     Objective:   /78 (BP Location: Left arm, Patient Position: Sitting, BP Cuff Size: Adult)   Pulse 87   Temp 36.7 °C (98.1 °F) (Temporal)   Wt 78.5 kg (173 lb)   SpO2 99%   BMI 29.70 kg/m²     Physical Exam  Constitutional:       General: She is not in acute distress.     Appearance: She is well-developed. She is not diaphoretic.   HENT:      Head: Normocephalic and atraumatic.      Right Ear: External ear normal.      Left Ear: External ear normal.      Nose: Nose normal.      Mouth/Throat:      Mouth: Mucous membranes are moist.      Pharynx: Oropharynx is clear.      Comments: Bilateral tonsils 3+ with mild erythema and moderate purulent exudate, no unilateral swelling, no uvular deviation  Eyes:      General: No scleral icterus.        Right eye: No discharge.         Left eye:  No discharge.      Conjunctiva/sclera: Conjunctivae normal.   Neck:      Musculoskeletal: Normal range of motion.      Trachea: No tracheal deviation.   Cardiovascular:      Rate and Rhythm: Normal rate and regular rhythm.   Pulmonary:      Effort: Pulmonary effort is normal. No respiratory distress.      Breath sounds: Normal breath sounds. No wheezing or rales.   Lymphadenopathy:      Cervical: Cervical adenopathy present.   Skin:     General: Skin is warm and dry.      Findings: No erythema or rash.   Neurological:      Mental Status: She is alert and oriented to person, place, and time.   Psychiatric:         Behavior: Behavior normal.         Thought Content: Thought content normal.         Judgment: Judgment normal.       Assessment/Plan:   Assessment    1. Pharyngitis, unspecified etiology  - POCT Rapid Strep A  - COVID/SARS COV-2 PCR; Future    2. Suspected COVID-19 virus infection  - COVID/SARS COV-2 PCR; Future    Patient with pharyngitis since yesterday.  Has bilateral tonsillitis which is rapid strep negative.  Reviewed supportive care measures and over-the-counter medication use.  Will obtain COVID testing and follow-up test results.

## 2020-12-22 ENCOUNTER — APPOINTMENT (OUTPATIENT)
Dept: FAMILY PLANNING/WOMEN'S HEALTH CLINIC | Facility: IMMUNIZATION CENTER | Age: 25
End: 2020-12-22
Attending: FAMILY MEDICINE
Payer: COMMERCIAL

## 2020-12-22 DIAGNOSIS — Z23 NEED FOR VACCINATION: ICD-10-CM

## 2020-12-22 PROCEDURE — 0001A PFIZER SARS-COV-2 VACCINE: CPT

## 2020-12-22 PROCEDURE — 91300 PFIZER SARS-COV-2 VACCINE: CPT

## 2020-12-23 ENCOUNTER — IMMUNIZATION (OUTPATIENT)
Dept: FAMILY PLANNING/WOMEN'S HEALTH CLINIC | Facility: IMMUNIZATION CENTER | Age: 25
End: 2020-12-23
Payer: COMMERCIAL

## 2020-12-23 DIAGNOSIS — Z23 ENCOUNTER FOR VACCINATION: Primary | ICD-10-CM

## 2021-01-12 ENCOUNTER — IMMUNIZATION (OUTPATIENT)
Dept: FAMILY PLANNING/WOMEN'S HEALTH CLINIC | Facility: IMMUNIZATION CENTER | Age: 26
End: 2021-01-12
Attending: FAMILY MEDICINE

## 2021-01-12 DIAGNOSIS — Z23 ENCOUNTER FOR VACCINATION: Primary | ICD-10-CM

## 2021-01-12 PROCEDURE — 91300 PFIZER SARS-COV-2 VACCINE: CPT

## 2021-01-12 PROCEDURE — 0002A PFIZER SARS-COV-2 VACCINE: CPT

## 2021-08-15 ENCOUNTER — APPOINTMENT (OUTPATIENT)
Dept: URGENT CARE | Facility: PHYSICIAN GROUP | Age: 26
End: 2021-08-15
Payer: COMMERCIAL

## 2022-01-25 ENCOUNTER — EMPLOYEE HEALTH (OUTPATIENT)
Dept: OCCUPATIONAL MEDICINE | Facility: CLINIC | Age: 27
End: 2022-01-25

## 2022-01-25 ENCOUNTER — EH NON-PROVIDER (OUTPATIENT)
Dept: OCCUPATIONAL MEDICINE | Facility: CLINIC | Age: 27
End: 2022-01-25

## 2022-01-25 ENCOUNTER — HOSPITAL ENCOUNTER (OUTPATIENT)
Facility: MEDICAL CENTER | Age: 27
End: 2022-01-25
Attending: PREVENTIVE MEDICINE
Payer: COMMERCIAL

## 2022-01-25 DIAGNOSIS — Z02.1 PRE-EMPLOYMENT DRUG SCREENING: ICD-10-CM

## 2022-01-25 DIAGNOSIS — Z02.1 PRE-EMPLOYMENT HEALTH SCREENING EXAMINATION: ICD-10-CM

## 2022-01-25 DIAGNOSIS — Z02.89 ENCOUNTER FOR OCCUPATIONAL HEALTH ASSESSMENT: ICD-10-CM

## 2022-01-25 LAB
AMP AMPHETAMINE: NORMAL
BAR BARBITURATES: NORMAL
BZO BENZODIAZEPINES: NORMAL
COC COCAINE: NORMAL
INT CON NEG: NORMAL
INT CON POS: NORMAL
MDMA ECSTASY: NORMAL
MET METHAMPHETAMINES: NORMAL
MTD METHADONE: NORMAL
OPI OPIATES: NORMAL
OXY OXYCODONE: NORMAL
PCP PHENCYCLIDINE: NORMAL
POC URINE DRUG SCREEN OCDRS: NEGATIVE
THC: NORMAL

## 2022-01-25 PROCEDURE — 8915 PR COMPREHENSIVE PHYSICAL: Performed by: NURSE PRACTITIONER

## 2022-01-25 PROCEDURE — 80305 DRUG TEST PRSMV DIR OPT OBS: CPT | Performed by: PREVENTIVE MEDICINE

## 2022-01-25 PROCEDURE — 86480 TB TEST CELL IMMUN MEASURE: CPT | Performed by: PREVENTIVE MEDICINE

## 2022-01-25 PROCEDURE — 94375 RESPIRATORY FLOW VOLUME LOOP: CPT | Performed by: PREVENTIVE MEDICINE

## 2022-01-26 LAB
GAMMA INTERFERON BACKGROUND BLD IA-ACNC: 0.04 IU/ML
M TB IFN-G BLD-IMP: NEGATIVE
M TB IFN-G CD4+ BCKGRND COR BLD-ACNC: -0.01 IU/ML
MITOGEN IGNF BCKGRD COR BLD-ACNC: 4.17 IU/ML
QFT TB2 - NIL TBQ2: 0 IU/ML

## 2022-03-25 ENCOUNTER — EH NON-PROVIDER (OUTPATIENT)
Dept: OCCUPATIONAL MEDICINE | Facility: CLINIC | Age: 27
End: 2022-03-25

## 2022-06-09 ENCOUNTER — HOSPITAL ENCOUNTER (OUTPATIENT)
Facility: MEDICAL CENTER | Age: 27
End: 2022-06-09
Payer: COMMERCIAL

## 2022-06-10 LAB
AMBIGUOUS DTTM AMBI4: NORMAL
COVID ORDER STATUS COVID19: NORMAL
SARS-COV-2 RNA RESP QL NAA+PROBE: DETECTED
SPECIMEN SOURCE: ABNORMAL

## 2022-11-27 ENCOUNTER — OFFICE VISIT (OUTPATIENT)
Dept: URGENT CARE | Facility: PHYSICIAN GROUP | Age: 27
End: 2022-11-27
Payer: COMMERCIAL

## 2022-11-27 ENCOUNTER — HOSPITAL ENCOUNTER (OUTPATIENT)
Facility: MEDICAL CENTER | Age: 27
End: 2022-11-27
Payer: COMMERCIAL

## 2022-11-27 VITALS
OXYGEN SATURATION: 97 % | TEMPERATURE: 98.4 F | RESPIRATION RATE: 20 BRPM | HEIGHT: 64 IN | WEIGHT: 155 LBS | DIASTOLIC BLOOD PRESSURE: 88 MMHG | BODY MASS INDEX: 26.46 KG/M2 | HEART RATE: 94 BPM | SYSTOLIC BLOOD PRESSURE: 128 MMHG

## 2022-11-27 DIAGNOSIS — J06.9 UPPER RESPIRATORY TRACT INFECTION, UNSPECIFIED TYPE: ICD-10-CM

## 2022-11-27 DIAGNOSIS — R68.83 CHILLS: ICD-10-CM

## 2022-11-27 DIAGNOSIS — R11.0 NAUSEA: ICD-10-CM

## 2022-11-27 DIAGNOSIS — R06.2 WHEEZING: ICD-10-CM

## 2022-11-27 LAB
EXTERNAL QUALITY CONTROL: NORMAL
INT CON NEG: NEGATIVE
INT CON NEG: NEGATIVE
INT CON POS: POSITIVE
INT CON POS: POSITIVE
S PYO AG THROAT QL: NEGATIVE
SARS-COV+SARS-COV-2 AG RESP QL IA.RAPID: NEGATIVE

## 2022-11-27 PROCEDURE — 0241U HCHG SARS-COV-2 COVID-19 NFCT DS RESP RNA 4 TRGT MIC: CPT

## 2022-11-27 PROCEDURE — 87880 STREP A ASSAY W/OPTIC: CPT

## 2022-11-27 PROCEDURE — 94640 AIRWAY INHALATION TREATMENT: CPT

## 2022-11-27 PROCEDURE — 99213 OFFICE O/P EST LOW 20 MIN: CPT | Mod: 25,CS

## 2022-11-27 PROCEDURE — 87426 SARSCOV CORONAVIRUS AG IA: CPT

## 2022-11-27 RX ORDER — BUPROPION HYDROCHLORIDE 150 MG/1
1 TABLET ORAL DAILY
COMMUNITY
Start: 2022-09-24 | End: 2023-01-30 | Stop reason: SDUPTHER

## 2022-11-27 RX ORDER — DROSPIRENONE AND ETHINYL ESTRADIOL TABLETS 0.02-3(28)
1 KIT ORAL DAILY
COMMUNITY
Start: 2022-09-13 | End: 2023-08-21 | Stop reason: SDUPTHER

## 2022-11-27 RX ORDER — ONDANSETRON 4 MG/1
4 TABLET, ORALLY DISINTEGRATING ORAL EVERY 6 HOURS PRN
Qty: 15 TABLET | Refills: 0 | Status: SHIPPED | OUTPATIENT
Start: 2022-11-27

## 2022-11-27 RX ORDER — FLUTICASONE PROPIONATE 50 MCG
1 SPRAY, SUSPENSION (ML) NASAL DAILY
Qty: 16 G | Refills: 0 | Status: SHIPPED | OUTPATIENT
Start: 2022-11-27 | End: 2023-01-30

## 2022-11-27 RX ORDER — ALBUTEROL SULFATE 2.5 MG/3ML
2.5 SOLUTION RESPIRATORY (INHALATION) ONCE
Status: COMPLETED | OUTPATIENT
Start: 2022-11-27 | End: 2022-11-27

## 2022-11-27 RX ORDER — PREDNISONE 20 MG/1
40 TABLET ORAL DAILY
Qty: 10 TABLET | Refills: 0 | Status: SHIPPED | OUTPATIENT
Start: 2022-11-27 | End: 2022-12-02

## 2022-11-27 RX ORDER — BUPROPION HYDROCHLORIDE 300 MG/1
1 TABLET ORAL DAILY
COMMUNITY
Start: 2022-09-24 | End: 2023-01-30 | Stop reason: SDUPTHER

## 2022-11-27 RX ORDER — ONDANSETRON 4 MG/1
4 TABLET, ORALLY DISINTEGRATING ORAL ONCE
Status: COMPLETED | OUTPATIENT
Start: 2022-11-27 | End: 2022-11-27

## 2022-11-27 RX ORDER — ALBUTEROL SULFATE 90 UG/1
2 AEROSOL, METERED RESPIRATORY (INHALATION) EVERY 6 HOURS PRN
Qty: 8.5 G | Refills: 0 | Status: SHIPPED | OUTPATIENT
Start: 2022-11-27 | End: 2023-01-30

## 2022-11-27 RX ORDER — CETIRIZINE HYDROCHLORIDE 10 MG/1
10 TABLET ORAL DAILY
Qty: 30 TABLET | Refills: 0 | Status: SHIPPED | OUTPATIENT
Start: 2022-11-27 | End: 2023-01-30

## 2022-11-27 RX ADMIN — ALBUTEROL SULFATE 2.5 MG: 2.5 SOLUTION RESPIRATORY (INHALATION) at 15:14

## 2022-11-27 RX ADMIN — ONDANSETRON 4 MG: 4 TABLET, ORALLY DISINTEGRATING ORAL at 15:16

## 2022-11-27 NOTE — LETTER
November 27, 2022    To Whom It May Concern:    This is confirmation that Jackelyn Woods was seen in urgent care today for illness. Please excuse her from work today. If you have any questions please do not hesitate to call me at the phone number listed below.    Sincerely,      Valerie Madrigal D.N.P.  (Electronically Signed)  [phone]

## 2022-11-27 NOTE — PROGRESS NOTES
Subjective:   Jackelyn Woods is a 27 y.o. female who presents for Pharyngitis (Onset 2 days/), Cough (Onset 2 days/), Nasal Congestion (Onset 2 days/), and Shortness of Breath (Onset 2 days)      HPI:    Patient presents to urgent care with complaints of dry cough, nasal congestion, sore throat, and shortness of breath. Patient denies history of asthma. Onset was two days ago. She reports cough is worse at night. 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 She endorses fever, chills, nausea. Reports slightly decreased oral intake secondary to nausea and difficulty breathing. Shortness of breath is worse with activity as well.   Mild improvement with fluids, rest, tylenol, otc cough medications.   Denies vomiting, diarrhea. Denies CP, dizziness, leg swelling, palpitations, racing heart.  Patient is vaccinated for influenza and covid.    ROS As above in HPI    Medications:    Current Outpatient Medications on File Prior to Visit   Medication Sig Dispense Refill    buPROPion (WELLBUTRIN XL) 300 MG XL tablet Take 1 Tablet by mouth every day.      buPROPion (WELLBUTRIN XL) 150 MG XL tablet Take 1 Tablet by mouth every day.      LORYNA 3-0.02 MG per tablet Take 1 Tablet by mouth every day.      ibuprofen (MOTRIN) 200 MG Tab Take 200 mg by mouth every 6 hours as needed.       No current facility-administered medications on file prior to visit.        Allergies:   Patient has no known allergies.    Problem List:   There is no problem list on file for this patient.       Surgical History:  No past surgical history on file.    Past Social Hx:   Social History     Tobacco Use    Smoking status: Never    Smokeless tobacco: Never   Vaping Use    Vaping Use: Never used          Problem list, medications, and allergies reviewed by myself today in Epic.     Objective:     /88 (BP Location: Left arm, Patient Position: Sitting, BP Cuff Size: Adult)   Pulse 94   Temp  "36.9 °C (98.4 °F) (Temporal)   Resp 20   Ht 1.626 m (5' 4\")   Wt 70.3 kg (155 lb)   SpO2 97%   BMI 26.61 kg/m²     Physical Exam  Vitals and nursing note reviewed.   Constitutional:       General: She is not in acute distress.     Appearance: Normal appearance. She is not ill-appearing or diaphoretic.   HENT:      Head: Normocephalic and atraumatic.      Right Ear: Ear canal normal. A middle ear effusion is present. Tympanic membrane is not erythematous or bulging.      Left Ear: Ear canal normal. A middle ear effusion is present. Tympanic membrane is not erythematous or bulging.      Nose: Congestion and rhinorrhea present. Rhinorrhea is clear.      Right Sinus: No maxillary sinus tenderness or frontal sinus tenderness.      Left Sinus: No maxillary sinus tenderness or frontal sinus tenderness.      Mouth/Throat:      Mouth: Mucous membranes are moist.      Pharynx: Oropharynx is clear. Uvula midline. Posterior oropharyngeal erythema present. No pharyngeal swelling or oropharyngeal exudate.      Tonsils: No tonsillar exudate or tonsillar abscesses.   Eyes:      Conjunctiva/sclera: Conjunctivae normal.      Pupils: Pupils are equal, round, and reactive to light.   Cardiovascular:      Rate and Rhythm: Normal rate and regular rhythm.      Heart sounds: Normal heart sounds.   Pulmonary:      Effort: Pulmonary effort is normal. No respiratory distress.      Breath sounds: Normal breath sounds. No stridor. No wheezing, rhonchi or rales.   Chest:      Chest wall: No tenderness.   Abdominal:      General: Bowel sounds are normal.      Palpations: Abdomen is soft.   Skin:     General: Skin is warm and dry.      Capillary Refill: Capillary refill takes less than 2 seconds.      Findings: No rash.   Neurological:      Mental Status: She is alert and oriented to person, place, and time.       Assessment/Plan:       Results for orders placed or performed in visit on 11/27/22   POCT Rapid Strep A   Result Value Ref Range "    Rapid Strep Screen Negative     Internal Control Positive Positive     Internal Control Negative Negative    POCT SARS-COV Antigen HORACE (Symptomatic only)   Result Value Ref Range    Internal  Valid     SARS-COV ANTIGEN HORACE Negative Negative, Indeterminate, None Detected, Not Detected, Detected, NotDetected, Valid, Invalid, Pass    Internal Control Positive Positive     Internal Control Negative Negative        Diagnosis and associated orders:   1. Wheezing  - albuterol (PROVENTIL) 2.5mg/3ml nebulizer solution 2.5 mg  - CoV-2, Flu A/B, And RSV by PCR (Cepheid); Future    2. Nausea  - ondansetron (ZOFRAN ODT) dispertab 4 mg  - ondansetron (ZOFRAN ODT) 4 MG TABLET DISPERSIBLE; Take 1 Tablet by mouth every 6 hours as needed for Nausea/Vomiting for up to 15 doses.  Dispense: 15 Tablet; Refill: 0    3. Chills  - POCT Rapid Strep A  - CoV-2, Flu A/B, And RSV by PCR (Cepheid); Future    4. Upper respiratory tract infection, unspecified type  - albuterol 108 (90 Base) MCG/ACT Aero Soln inhalation aerosol; Inhale 2 Puffs every 6 hours as needed for Shortness of Breath.  Dispense: 8.5 g; Refill: 0  - predniSONE (DELTASONE) 20 MG Tab; Take 2 Tablets by mouth every day for 5 days.  Dispense: 10 Tablet; Refill: 0  - fluticasone (FLONASE) 50 MCG/ACT nasal spray; Administer 1 Spray into affected nostril(S) every day.  Dispense: 16 g; Refill: 0  - cetirizine (ZYRTEC ALLERGY) 10 MG Tab; Take 1 Tablet by mouth every day.  Dispense: 30 Tablet; Refill: 0  - POCT SARS-COV Antigen HORACE (Symptomatic only)         Comments/MDM:     Rapid strep is negative, cepheid pcr obtained. Will notify patient of results when available.   Lungs are cta in all lobes bilaterally s/p nebulized albuterol.   Supportive measures encouraged: Rest, increased oral hydration, NSAIDs/tylenol as needed per package instructions, decongestant, warm humidification, nasal saline rinses, lozenges, otc cough suppressant as needed.  Return to urgent care  prn if new or worsening sx or if there is no improvement in condition prn.  Educated in Red flags and indications to immediately call 911 or present to the Emergency Department.        Pt is clinically stable at today's acute urgent care visit.  No acute distress noted. Appropriate for outpatient management at this time.       Discussed DDx, management options (risks,benefits, and alternatives to planned treatment), natural progression and supportive care.  Expressed understanding and the treatment plan was agreed upon. Questions were encouraged and answered     Please note that this dictation was created using voice recognition software. I have made a reasonable attempt to correct obvious errors, but I expect that there are errors of grammar and possibly content that I did not discover before finalizing the note.    This note was electronically signed by Valerie Madrigal, DNP

## 2022-11-28 DIAGNOSIS — R68.83 CHILLS: ICD-10-CM

## 2022-11-28 DIAGNOSIS — R06.2 WHEEZING: ICD-10-CM

## 2022-11-28 LAB
FLUAV RNA SPEC QL NAA+PROBE: NEGATIVE
FLUBV RNA SPEC QL NAA+PROBE: NEGATIVE
RSV RNA SPEC QL NAA+PROBE: NEGATIVE
SARS-COV-2 RNA RESP QL NAA+PROBE: NOTDETECTED
SPECIMEN SOURCE: NORMAL

## 2023-01-30 ENCOUNTER — OFFICE VISIT (OUTPATIENT)
Dept: MEDICAL GROUP | Facility: IMAGING CENTER | Age: 28
End: 2023-01-30
Payer: COMMERCIAL

## 2023-01-30 VITALS
HEIGHT: 64 IN | HEART RATE: 73 BPM | WEIGHT: 155.8 LBS | SYSTOLIC BLOOD PRESSURE: 122 MMHG | DIASTOLIC BLOOD PRESSURE: 80 MMHG | BODY MASS INDEX: 26.6 KG/M2 | OXYGEN SATURATION: 94 % | RESPIRATION RATE: 16 BRPM | TEMPERATURE: 98.1 F

## 2023-01-30 DIAGNOSIS — R11.2 NAUSEA AND VOMITING, UNSPECIFIED VOMITING TYPE: ICD-10-CM

## 2023-01-30 DIAGNOSIS — Z30.41 ENCOUNTER FOR SURVEILLANCE OF CONTRACEPTIVE PILLS: ICD-10-CM

## 2023-01-30 DIAGNOSIS — T75.3XXA MOTION SICKNESS, INITIAL ENCOUNTER: ICD-10-CM

## 2023-01-30 DIAGNOSIS — F41.9 ANXIETY AND DEPRESSION: ICD-10-CM

## 2023-01-30 DIAGNOSIS — Z76.89 ENCOUNTER TO ESTABLISH CARE WITH NEW DOCTOR: ICD-10-CM

## 2023-01-30 DIAGNOSIS — F32.A ANXIETY AND DEPRESSION: ICD-10-CM

## 2023-01-30 PROCEDURE — 99204 OFFICE O/P NEW MOD 45 MIN: CPT | Performed by: CLINICAL NURSE SPECIALIST

## 2023-01-30 RX ORDER — BUPROPION HYDROCHLORIDE 150 MG/1
150 TABLET ORAL DAILY
Qty: 90 TABLET | Refills: 3 | Status: SHIPPED | OUTPATIENT
Start: 2023-01-30 | End: 2023-08-21 | Stop reason: SDUPTHER

## 2023-01-30 RX ORDER — SCOLOPAMINE TRANSDERMAL SYSTEM 1 MG/1
1 PATCH, EXTENDED RELEASE TRANSDERMAL
Qty: 4 PATCH | Refills: 0 | Status: SHIPPED | OUTPATIENT
Start: 2023-01-30 | End: 2023-08-21

## 2023-01-30 RX ORDER — BUPROPION HYDROCHLORIDE 300 MG/1
300 TABLET ORAL DAILY
Qty: 90 TABLET | Refills: 3 | Status: SHIPPED | OUTPATIENT
Start: 2023-01-30 | End: 2023-08-21 | Stop reason: SDUPTHER

## 2023-01-30 ASSESSMENT — ANXIETY QUESTIONNAIRES
1. FEELING NERVOUS, ANXIOUS, OR ON EDGE: NOT AT ALL
3. WORRYING TOO MUCH ABOUT DIFFERENT THINGS: NOT AT ALL
4. TROUBLE RELAXING: SEVERAL DAYS
2. NOT BEING ABLE TO STOP OR CONTROL WORRYING: NOT AT ALL
6. BECOMING EASILY ANNOYED OR IRRITABLE: NOT AT ALL
7. FEELING AFRAID AS IF SOMETHING AWFUL MIGHT HAPPEN: NOT AT ALL

## 2023-01-30 ASSESSMENT — PAIN SCALES - GENERAL: PAINLEVEL: NO PAIN

## 2023-01-30 ASSESSMENT — PATIENT HEALTH QUESTIONNAIRE - PHQ9: CLINICAL INTERPRETATION OF PHQ2 SCORE: 0

## 2023-01-30 NOTE — ASSESSMENT & PLAN NOTE
Uses Loryna and tolerating well.  She is negative for the following risk factors: tobacco use, age >35, hx of breast cancer, CVD, personal or family history of DVT, migraines with auras, liver disease, diabetes, and HTN. She is to inform the provider if any of the above conditions arise.

## 2023-01-30 NOTE — ASSESSMENT & PLAN NOTE
Taking Wellbutrin 450mg XL daily which controls both well.  Seeing a therapist who she loves.  Worse around menses. No suicide attempts.

## 2023-01-30 NOTE — ASSESSMENT & PLAN NOTE
Chronic intermittent nausea worse when working night shifts.  Can be accompanied by headache.  She occasionally vomited while on night shift but not since stopping this rotation.  She gets motion sickness easily.  Take ondansetron once every 3-4 weeks.

## 2023-01-30 NOTE — PROGRESS NOTES
"Subjective     Jackelyn Woods is a 27 y.o. female who presents with Establish Care            HPI  Had severe respiratory illness  a couple of months ago and was given albuterol.  She has not used this since she recovered.     Nausea & vomiting  Chronic intermittent nausea worse when working night shifts.  Can be accompanied by headache.  She occasionally vomited while on night shift but not since stopping this rotation.  She gets motion sickness easily.  Take ondansetron once every 3-4 weeks.     Anxiety and depression  Taking Wellbutrin 450mg XL daily which controls both well.  Seeing a therapist who she loves.  Worse around menses. No suicide attempts.      Encounter for surveillance of contraceptive pills  Uses Loryna and tolerating well.  She is negative for the following risk factors: tobacco use, age >35, hx of breast cancer, CVD, personal or family history of DVT, migraines with auras, liver disease, diabetes, and HTN. She is to inform the provider if any of the above conditions arise.            ROS  See HPI    No Known Allergies    Current Outpatient Medications on File Prior to Visit   Medication Sig Dispense Refill    LORYNA 3-0.02 MG per tablet Take 1 Tablet by mouth every day.      ondansetron (ZOFRAN ODT) 4 MG TABLET DISPERSIBLE Take 1 Tablet by mouth every 6 hours as needed for Nausea/Vomiting for up to 15 doses. 15 Tablet 0    ibuprofen (MOTRIN) 200 MG Tab Take 200 mg by mouth every 6 hours as needed.       No current facility-administered medications on file prior to visit.              Objective     /80 (BP Location: Left arm, Patient Position: Sitting, BP Cuff Size: Adult)   Pulse 73   Temp 36.7 °C (98.1 °F) (Temporal)   Resp 16   Ht 1.626 m (5' 4\")   Wt 70.7 kg (155 lb 12.8 oz)   LMP 01/13/2023 (Within Days)   SpO2 94%   BMI 26.74 kg/m²      Physical Exam  Constitutional:       General: She is not in acute distress.     Appearance: Normal appearance. She is not ill-appearing, " toxic-appearing or diaphoretic.   HENT:      Head: Normocephalic and atraumatic.   Eyes:      Extraocular Movements: Extraocular movements intact.      Pupils: Pupils are equal, round, and reactive to light.   Cardiovascular:      Rate and Rhythm: Normal rate and regular rhythm.      Heart sounds: Normal heart sounds.   Pulmonary:      Effort: Pulmonary effort is normal.      Breath sounds: Normal breath sounds.   Skin:     General: Skin is warm and dry.   Neurological:      Mental Status: She is alert and oriented to person, place, and time.      Gait: Gait normal.   Psychiatric:         Mood and Affect: Mood normal.         Behavior: Behavior normal.         Thought Content: Thought content normal.         Judgment: Judgment normal.                 Assessment & Plan      1. Encounter to establish care with new doctor  RN in postpartum.  History reviewed.      2. Nausea and vomiting, unspecified vomiting type  Chronic, intermittent.  She believes related to exhaustion.    CONTINUE ondansetron 4mg as needed    3. Anxiety and depression  Chronic, controlled. Continue behavioral health and Wellbutrin as below.    - buPROPion (WELLBUTRIN XL) 150 MG XL tablet; Take 1 Tablet by mouth every day.  Dispense: 90 Tablet; Refill: 3  - buPROPion (WELLBUTRIN XL) 300 MG XL tablet; Take 1 Tablet by mouth every day.  Dispense: 90 Tablet; Refill: 3    4. Encounter for surveillance of contraceptive pills  Chronic, controlled. Tolerating well. Continue Loryna daily.    5. Motion sickness, initial encounter  Chronic, intermittent. Has a cruise upcoming and would like scopolamine patch.  Ordered as below.      - scopolamine (TRANSDERM SCOP, 1.5 MG,) 1 mg/72hr PATCH 72 HR; Place 1 Patch on the skin every 72 hours.  Dispense: 4 Patch; Refill: 0    Return if symptoms worsen or fail to improve, for annual/wellness.

## 2023-03-31 ENCOUNTER — TELEPHONE (OUTPATIENT)
Dept: HEALTH INFORMATION MANAGEMENT | Facility: OTHER | Age: 28
End: 2023-03-31

## 2023-07-19 ENCOUNTER — TELEPHONE (OUTPATIENT)
Dept: HEALTH INFORMATION MANAGEMENT | Facility: OTHER | Age: 28
End: 2023-07-19
Payer: COMMERCIAL

## 2023-08-21 ENCOUNTER — OFFICE VISIT (OUTPATIENT)
Dept: MEDICAL GROUP | Facility: IMAGING CENTER | Age: 28
End: 2023-08-21
Payer: COMMERCIAL

## 2023-08-21 VITALS
TEMPERATURE: 98 F | BODY MASS INDEX: 28.17 KG/M2 | OXYGEN SATURATION: 100 % | SYSTOLIC BLOOD PRESSURE: 134 MMHG | WEIGHT: 165 LBS | HEART RATE: 90 BPM | HEIGHT: 64 IN | DIASTOLIC BLOOD PRESSURE: 78 MMHG | RESPIRATION RATE: 16 BRPM

## 2023-08-21 DIAGNOSIS — Z00.00 WELLNESS EXAMINATION: ICD-10-CM

## 2023-08-21 DIAGNOSIS — F32.A ANXIETY AND DEPRESSION: ICD-10-CM

## 2023-08-21 DIAGNOSIS — F41.9 ANXIETY AND DEPRESSION: ICD-10-CM

## 2023-08-21 DIAGNOSIS — Z30.41 ENCOUNTER FOR SURVEILLANCE OF CONTRACEPTIVE PILLS: ICD-10-CM

## 2023-08-21 PROCEDURE — 3075F SYST BP GE 130 - 139MM HG: CPT

## 2023-08-21 PROCEDURE — 99395 PREV VISIT EST AGE 18-39: CPT

## 2023-08-21 PROCEDURE — 3078F DIAST BP <80 MM HG: CPT

## 2023-08-21 RX ORDER — DROSPIRENONE AND ETHINYL ESTRADIOL TABLETS 0.02-3(28)
1 KIT ORAL DAILY
Qty: 84 TABLET | Refills: 3 | Status: SHIPPED | OUTPATIENT
Start: 2023-08-21

## 2023-08-21 RX ORDER — BUPROPION HYDROCHLORIDE 150 MG/1
150 TABLET ORAL DAILY
Qty: 90 TABLET | Refills: 3 | Status: SHIPPED | OUTPATIENT
Start: 2023-08-21

## 2023-08-21 RX ORDER — BUPROPION HYDROCHLORIDE 300 MG/1
300 TABLET ORAL DAILY
Qty: 90 TABLET | Refills: 3 | Status: SHIPPED | OUTPATIENT
Start: 2023-08-21

## 2023-08-21 ASSESSMENT — ANXIETY QUESTIONNAIRES
GAD7 TOTAL SCORE: 2
5. BEING SO RESTLESS THAT IT IS HARD TO SIT STILL: NOT AT ALL
6. BECOMING EASILY ANNOYED OR IRRITABLE: SEVERAL DAYS
4. TROUBLE RELAXING: SEVERAL DAYS
3. WORRYING TOO MUCH ABOUT DIFFERENT THINGS: NOT AT ALL
2. NOT BEING ABLE TO STOP OR CONTROL WORRYING: NOT AT ALL
7. FEELING AFRAID AS IF SOMETHING AWFUL MIGHT HAPPEN: NOT AT ALL
1. FEELING NERVOUS, ANXIOUS, OR ON EDGE: NOT AT ALL

## 2023-08-21 ASSESSMENT — PATIENT HEALTH QUESTIONNAIRE - PHQ9: CLINICAL INTERPRETATION OF PHQ2 SCORE: 0

## 2023-08-21 NOTE — PROGRESS NOTES
Chief Complaint   Patient presents with    Establish Care     PCP brendan     Other     Medication refill and general check up      HISTORY OF THE PRESENT ILLNESS: Patient is a 27 y.o. female. This pleasant patient is here today to establish care and to discuss:    To establish care  Previous PCP Brendan Tim    Anxiety and depression  Chronic condition. Patient is taking buproprion 450 XL. Tolerating medication well without side effects.   She is seeing a therapist once very 2 months.  Denies intent to harm self of others. NO recent mental health hospitalizations.    Encounter for surveillance of contraceptive pills  Chronic condition. Patient is taking Loryna. Tolerating medication well without side effects.   Denies personal or family hx of blood clots.    Ob-Gyn/ History:    Patient has GYN provider: yes, Dr. Moe Courtney  /Para:  0  Last Pap Smear:  up to date . no history of abnormal pap smears.  Gyn Surgery:  0.  Current Contraceptive Method:  BELA. yes currently sexually active.  Last menstrual period:  7/10.  Periods regular. moderate bleeding. Cramping is moderate.   She does take OTC analgesics for cramps.  No significant bloating/fluid retention, pelvic pain, or dyspareunia. No vaginal discharge  Post-menopausal bleeding: no   Urinary incontinence: no  Folate intake: no     Health Maintenance  Diet: for the most part health well balanced meal   Exercise: yes, 3-4x/week   Substance Abuse: no   Safe in relationship. yes   Seat belts, bike helmet, gun safety discussed.  Sun protection used.  Dentist: yes  Eye Doctor: yes    Cancer screening  Cervical Cancer Screening: up to date     Infectious disease screening/Immunizations :  Up to date on immunizations          Allergies: Patient has no known allergies.    Current Outpatient Medications Ordered in Epic   Medication Sig Dispense Refill    buPROPion (WELLBUTRIN XL) 150 MG XL tablet Take 1 Tablet by mouth every day. 90 Tablet 3    buPROPion  (WELLBUTRIN XL) 300 MG XL tablet Take 1 Tablet by mouth every day. 90 Tablet 3    LORYNA 3-0.02 MG per tablet Take 1 Tablet by mouth every day. 84 Tablet 3    ondansetron (ZOFRAN ODT) 4 MG TABLET DISPERSIBLE Take 1 Tablet by mouth every 6 hours as needed for Nausea/Vomiting for up to 15 doses. 15 Tablet 0    ibuprofen (MOTRIN) 200 MG Tab Take 200 mg by mouth every 6 hours as needed.       No current Epic-ordered facility-administered medications on file.       History reviewed. No pertinent past medical history.    Past Surgical History:   Procedure Laterality Date    KNEE ARTHROSCOPY Left     2020       Social History     Tobacco Use    Smoking status: Never    Smokeless tobacco: Never   Vaping Use    Vaping Use: Never used   Substance Use Topics    Alcohol use: Yes     Comment: socially    Drug use: Never       Social History     Social History Narrative    Not on file       History reviewed. No pertinent family history.    ROS: per hpi    - Constitutional: Negative for fever, chills, unexpected weight change, and fatigue/generalized weakness.     - HEENT: Negative for headaches, vision changes, hearing changes, ear pain, ear discharge, rhinorrhea, sinus congestion, sore throat, and neck pain.      - Respiratory: Negative for cough, sputum production, chest congestion, dyspnea, wheezing, and crackles.      - Cardiovascular: Negative for chest pain, palpitations, orthopnea, and bilateral lower extremity edema.     - Gastrointestinal: Negative for heartburn, nausea, vomiting, abdominal pain, hematochezia, melena, diarrhea, constipation, and greasy/foul-smelling stools.     - Genitourinary: Negative for dysuria, polyuria, hematuria, pyuria, urinary urgency, and urinary incontinence.     - Musculoskeletal: Negative for myalgias, back pain, and joint pain.     - Skin: Negative for rash, itching, cyanotic skin color change.     - Neurological: Negative for dizziness, tingling, tremors, focal sensory deficit, focal  "weakness and headaches.     - Endo/Heme/Allergies: Does not bruise/bleed easily.     - Psychiatric/Behavioral: Negative for depression, suicidal/homicidal ideation and memory loss.        - NOTE: All other systems reviewed and are negative, except as in HPI.      Exam: /78 (BP Location: Left arm, Patient Position: Sitting, BP Cuff Size: Adult)   Pulse 90   Temp 36.7 °C (98 °F) (Temporal)   Resp 16   Ht 1.626 m (5' 4\")   Wt 74.8 kg (165 lb)   SpO2 100%  Body mass index is 28.32 kg/m².    General: Normal appearing. No distress.  HEENT: Normocephalic. Eyes conjunctiva clear lids without ptosis, pupils equal and reactive to light accommodation, ears normal shape and contour, canals are clear bilaterally, tympanic membranes are benign, nasal mucosa benign, oropharynx is without erythema, edema or exudates.   Neck: Supple without JVD or bruit. Thyroid is not enlarged.  Pulmonary: Clear to ausculation.  Normal effort. No rales, ronchi, or wheezing.  Cardiovascular: Regular rate and rhythm without murmur. Carotid and radial pulses are intact and equal bilaterally.  Abdomen: Soft, nontender, nondistended. Normal bowel sounds. Liver and spleen are not palpable  Neurologic: Grossly nonfocal  Lymph: No cervical, supraclavicular or axillary lymph nodes are palpable  Skin: Warm and dry.  No obvious lesions.  Musculoskeletal: Normal gait. No extremity cyanosis, clubbing, or edema.  Psych: Normal mood and affect. Alert and oriented x3. Judgment and insight is normal.    Please note that this dictation was created using voice recognition software. I have made every reasonable attempt to correct obvious errors, but I expect that there are errors of grammar and possibly content that I did not discover before finalizing the note.      Assessment/Plan    27 y.o. female with the following -    1. Wellness examination  PMH/PSH/FH/Social history reviewed. Medication reconciled. Vaccinations discussed. Previous records and labs " reviewed. Discussed age appropriate anticipatory guidance.      2. Anxiety and depression  Chronic and stable condition on bupropion.  Refill sent to pharmacy.  Discussed medications desired effects, potential side effects, and how to administer medication.  Discussed nonpharmacological interventions such as seeing a therapist, stress reduction, mindful techniques, diet and exercise.    - buPROPion (WELLBUTRIN XL) 150 MG XL tablet; Take 1 Tablet by mouth every day.  Dispense: 90 Tablet; Refill: 3  - buPROPion (WELLBUTRIN XL) 300 MG XL tablet; Take 1 Tablet by mouth every day.  Dispense: 90 Tablet; Refill: 3    2. Encounter for surveillance of contraceptive pills  Chronic, controlled. Med refill sent to pharmacy. Med administration and side effects discussed.  S/s blood clots discussed.  Safe sex practices recommended.    - LORYNA 3-0.02 MG per tablet; Take 1 Tablet by mouth every day.  Dispense: 84 Tablet; Refill: 3    Medical Decision Making/Course:  In the course of preparing for this visit with review of the pertinent past medical history, recent and past clinic visits, current medications, and performing chart, immunization, medical history and medication reconciliation, and in the further course of obtaining the current history pertinent to the clinic visit today, performing an exam and evaluation, ordering and independently evaluating labs, tests, and/or procedures, prescribing any recommended new medications as noted above, providing any pertinent counseling and education and recommending further coordination of care. This was discussed with patient in a shared-decision making conversation, and they understand and agreed with plan of care.     Return in about 1 year (around 8/21/2024), or if symptoms worsen or fail to improve, for annual.    Thank you, Lashanda CORBIN  [facility]    Please note that this dictation was created using voice recognition software. I have made every reasonable  attempt to correct obvious errors, but I expect that there are errors of grammar and possibly content that I did not discover before finalizing the note.

## 2024-03-11 ENCOUNTER — OFFICE VISIT (OUTPATIENT)
Dept: MEDICAL GROUP | Facility: IMAGING CENTER | Age: 29
End: 2024-03-11
Payer: COMMERCIAL

## 2024-03-11 VITALS
HEIGHT: 64 IN | DIASTOLIC BLOOD PRESSURE: 78 MMHG | WEIGHT: 165.6 LBS | HEART RATE: 89 BPM | SYSTOLIC BLOOD PRESSURE: 108 MMHG | RESPIRATION RATE: 14 BRPM | OXYGEN SATURATION: 97 % | TEMPERATURE: 97.6 F | BODY MASS INDEX: 28.27 KG/M2

## 2024-03-11 DIAGNOSIS — R68.84 JAW PAIN: ICD-10-CM

## 2024-03-11 PROCEDURE — 3078F DIAST BP <80 MM HG: CPT

## 2024-03-11 PROCEDURE — 3074F SYST BP LT 130 MM HG: CPT

## 2024-03-11 PROCEDURE — 99213 OFFICE O/P EST LOW 20 MIN: CPT

## 2024-03-11 ASSESSMENT — PATIENT HEALTH QUESTIONNAIRE - PHQ9: CLINICAL INTERPRETATION OF PHQ2 SCORE: 0

## 2024-03-11 NOTE — PROGRESS NOTES
Subjective:     CC:   Chief Complaint   Patient presents with    Other     Pt report jaw pain x 2 mos   Right side , sharp pain radiated  and eating and sometimes click   Opening mouth it is sore        HPI:   Jackelyn presents today to discuss:    Right sided jaw pain  Pt reports developing right sided jaw pain 4 months ago that has worsened the past 2 months. She is seeing a dentist, teeth WNL.  She denies grinding her teeth.  She admits chewing food provokes her pain. She started soft food diet which is helpful. No pain at rest.  She reports sharp shooting pain that radiates to right cheek area with chewing and jaw popping when she bites down.  She has tried OTC medication which provides temporary relief  She denies fevers, oral abscess, occultly swallowing, difficulty breathing, recent injury or trauma to affected area.    History reviewed. No pertinent past medical history.  History reviewed. No pertinent family history.  Past Surgical History:   Procedure Laterality Date    KNEE ARTHROSCOPY Left     2020     Social History     Tobacco Use    Smoking status: Never    Smokeless tobacco: Never   Vaping Use    Vaping Use: Never used   Substance Use Topics    Alcohol use: Yes     Comment: socially    Drug use: Never     Social History     Social History Narrative    Not on file     Current Outpatient Medications Ordered in Epic   Medication Sig Dispense Refill    buPROPion (WELLBUTRIN XL) 150 MG XL tablet Take 1 Tablet by mouth every day. 90 Tablet 3    buPROPion (WELLBUTRIN XL) 300 MG XL tablet Take 1 Tablet by mouth every day. 90 Tablet 3    LORYNA 3-0.02 MG per tablet Take 1 Tablet by mouth every day. 84 Tablet 3    ondansetron (ZOFRAN ODT) 4 MG TABLET DISPERSIBLE Take 1 Tablet by mouth every 6 hours as needed for Nausea/Vomiting for up to 15 doses. 15 Tablet 0    ibuprofen (MOTRIN) 200 MG Tab Take 200 mg by mouth every 6 hours as needed.       No current Epic-ordered facility-administered medications on file.  "    Patient has no known allergies.    ROS: see hpi  Gen: no fevers/chills  Pulm: no sob, no cough  CV: no chest pain, no palpitations, no edema  GI: no nausea/vomiting, no diarrhea  Skin: no rash    Objective:   Exam:  /78 (BP Location: Right arm, Patient Position: Sitting, BP Cuff Size: Adult)   Pulse 89   Temp 36.4 °C (97.6 °F) (Temporal)   Resp 14   Ht 1.626 m (5' 4\")   Wt 75.1 kg (165 lb 9.6 oz)   LMP 02/29/2024   SpO2 97%   BMI 28.43 kg/m²    Body mass index is 28.43 kg/m².    Gen: Alert and oriented, No apparent distress.  HEENT: Head atraumatic, normocephalic. Pupils equal and round.  Neck: Neck is supple without lymphadenopathy.   Lungs: Normal effort, CTA bilaterally, no wheezes, rhonchi, or rales  CV: Regular rate and rhythm. No murmurs, rubs, or gallops.  ABD: +BS. Non-tender, non-distended. No rebound, rigidity, or guarding.  Ext: No clubbing, cyanosis, edema.    Assessment & Plan:     28 y.o. female with the following -     1. Jaw pain  New issue.  No s/s of infectious etiology. Patient presentation consistent with TMJ syndrome.  Will refer to TMJ clinic.  Continue with soft diet, increase fluid intake, facial muscle massages, OTC NSAIDs, tylenol PRN for pain.     - Referral to TMJ Pain Clinic    Medical Decision Making/Course:  In the course of preparing for this visit with review of the pertinent past medical history, recent and past clinic visits, current medications, and performing chart, immunization, medical history and medication reconciliation, and in the further course of obtaining the current history pertinent to the clinic visit today, performing an exam and evaluation, ordering and independently evaluating labs, tests, and/or procedures, prescribing any recommended new medications as noted above, providing any pertinent counseling and education and recommending further coordination of care. This was discussed with patient in a shared-decision making conversation, and they " understand and agreed with plan of care.       Return if symptoms worsen or fail to improve.    SUZE Mar.   [facility]    Please note that this dictation was created using voice recognition software. I have made every reasonable attempt to correct obvious errors, but I expect that there are errors of grammar and possibly content that I did not discover before finalizing the note.

## 2024-04-19 ENCOUNTER — OFFICE VISIT (OUTPATIENT)
Dept: URGENT CARE | Facility: PHYSICIAN GROUP | Age: 29
End: 2024-04-19
Payer: COMMERCIAL

## 2024-04-19 ENCOUNTER — HOSPITAL ENCOUNTER (OUTPATIENT)
Dept: RADIOLOGY | Facility: MEDICAL CENTER | Age: 29
End: 2024-04-19
Attending: NURSE PRACTITIONER
Payer: COMMERCIAL

## 2024-04-19 VITALS
HEIGHT: 64 IN | WEIGHT: 164.4 LBS | DIASTOLIC BLOOD PRESSURE: 70 MMHG | BODY MASS INDEX: 28.07 KG/M2 | TEMPERATURE: 97.1 F | SYSTOLIC BLOOD PRESSURE: 110 MMHG | RESPIRATION RATE: 16 BRPM | HEART RATE: 77 BPM | OXYGEN SATURATION: 98 %

## 2024-04-19 DIAGNOSIS — S99.912A INJURY OF LEFT ANKLE, INITIAL ENCOUNTER: ICD-10-CM

## 2024-04-19 DIAGNOSIS — S93.402A SPRAIN OF LEFT ANKLE, UNSPECIFIED LIGAMENT, INITIAL ENCOUNTER: ICD-10-CM

## 2024-04-19 PROCEDURE — 3074F SYST BP LT 130 MM HG: CPT | Performed by: NURSE PRACTITIONER

## 2024-04-19 PROCEDURE — 3078F DIAST BP <80 MM HG: CPT | Performed by: NURSE PRACTITIONER

## 2024-04-19 PROCEDURE — 73610 X-RAY EXAM OF ANKLE: CPT | Mod: LT

## 2024-04-19 PROCEDURE — 99213 OFFICE O/P EST LOW 20 MIN: CPT | Performed by: NURSE PRACTITIONER

## 2024-04-19 RX ORDER — IBUPROFEN 800 MG/1
TABLET ORAL
COMMUNITY
Start: 2024-03-18

## 2024-04-19 ASSESSMENT — ENCOUNTER SYMPTOMS
SENSORY CHANGE: 0
NUMBNESS: 0

## 2024-04-19 NOTE — PATIENT INSTRUCTIONS
-NSAID's (ibuprofen) and tylenol as directed for pain and inflammation.   -RICE Therapy: Rest, Ice, Compression, Elevation  -Limited weight bearing for 2-3 days;  and/or weight bearing as tolerated. Use crutches until able to walk with normal gait.    -Ice 15 to 20 minutes every two to three hours for the first 48 hours or until swelling is improved.  -Compression with an elastic bandage for swelling.  -Range of motion exercises.     Follow up with PCP. Follow up emergently for severe uncontrolled pain, neurovascular compromise (decreased sensation, motion, or circulation). Follow up if symptoms persist for more than six to eight weeks.

## 2024-04-19 NOTE — LETTER
April 19, 2024         Patient: Jackelyn Woods   YOB: 1995   Date of Visit: 4/19/2024           To Whom it May Concern:    Jackelyn Woods was seen in my clinic on 4/19/2024. She may return to work on  4/24/2024.  Recommendation is to wear ankle wrap and advance ambulation as tolerated. May need the support of crutches the first week, and follow up with Orthopedic Specialist for persistent symptoms.    If you have any questions or concerns, please don't hesitate to call.        Sincerely,           SUZE Pollock.  Electronically Signed

## 2024-04-19 NOTE — PROGRESS NOTES
Subjective:     Jackelyn Woods is a 28 y.o. female who presents for Ankle Sprain (Left ankle, x1 day, was at the gym last night)      Presents for left lateral ankle pain. States she rolled it laterally at the gym last night. Has difficulty bearing weight. Pain in achillis area with dorsiflexion. Has a compression wrap and crutches. No hx of prioir ankle injury.     Ankle Injury   The pain is at a severity of 1/10. Pertinent negatives include no numbness. The symptoms are aggravated by palpation, weight bearing and movement. She has tried NSAIDs for the symptoms.       History reviewed. No pertinent past medical history.    Past Surgical History:   Procedure Laterality Date    KNEE ARTHROSCOPY Left     2020       Social History     Socioeconomic History    Marital status:      Spouse name: Not on file    Number of children: Not on file    Years of education: Not on file    Highest education level: Not on file   Occupational History    Not on file   Tobacco Use    Smoking status: Never    Smokeless tobacco: Never   Vaping Use    Vaping Use: Never used   Substance and Sexual Activity    Alcohol use: Yes     Comment: socially    Drug use: Never    Sexual activity: Yes     Partners: Male     Birth control/protection: Pill   Other Topics Concern    Not on file   Social History Narrative    Not on file     Social Determinants of Health     Financial Resource Strain: Not on file   Food Insecurity: Not on file   Transportation Needs: Not on file   Physical Activity: Not on file   Stress: Not on file   Social Connections: Not on file   Intimate Partner Violence: Not on file   Housing Stability: Not on file        History reviewed. No pertinent family history.     No Known Allergies    Review of Systems   Musculoskeletal:  Positive for joint pain.   Neurological:  Negative for sensory change and numbness.   All other systems reviewed and are negative.       Objective:   /70 (BP Location: Left arm, Patient  "Position: Sitting, BP Cuff Size: Adult)   Pulse 77   Temp 36.2 °C (97.1 °F)   Resp 16   Ht 1.626 m (5' 4\")   Wt 74.6 kg (164 lb 6.4 oz)   SpO2 98%   BMI 28.22 kg/m²     Physical Exam  Vitals reviewed.   Constitutional:       General: She is not in acute distress.  Cardiovascular:      Pulses:           Dorsalis pedis pulses are 2+ on the left side.   Pulmonary:      Effort: Pulmonary effort is normal.   Musculoskeletal:         General: Swelling and tenderness present.      Left ankle: Swelling and ecchymosis present. Tenderness present over the lateral malleolus. No base of 5th metatarsal tenderness.      Left Achilles Tendon: No tenderness or defects. Sharma's test negative.      Left foot: Normal capillary refill. No bony tenderness. Normal pulse.      Comments: Negative squeeze test.    Skin:     General: Skin is warm and dry.      Capillary Refill: Capillary refill takes less than 2 seconds.      Findings: Bruising present.   Neurological:      Mental Status: She is alert and oriented to person, place, and time.         Assessment/Plan:   1. Sprain of left ankle, unspecified ligament, initial encounter  - Referral to Orthopedics    2. Injury of left ankle, initial encounter  - DX-ANKLE 3+ VIEWS LEFT; Future  - Referral to Orthopedics    Other orders  - ibuprofen (MOTRIN) 800 MG Tab; TAKE 1 TABLET BY ORAL ROUTE EVERY 8 HOURS AS NEEDED POST OP PAIN  DX-ANKLE 3+ VIEWS LEFT    Result Date: 4/19/2024 4/19/2024 8:41 AM HISTORY/REASON FOR EXAM:  Pain/Deformity Following Trauma. Rolled LEFT ankle last night, pain. TECHNIQUE/EXAM DESCRIPTION AND NUMBER OF VIEWS:  3 views of the LEFT ankle. COMPARISON: None. FINDINGS: Lateral soft tissue swelling at the ankle. Mortise is congruent. No fracture or dislocation.     1.  No fracture or dislocation of LEFT ankle. 2.  Lateral soft tissue swelling.    -Ankle brace.   -NSAID's (ibuprofen) and tylenol as directed for pain and inflammation.   -RICE Therapy: Rest, Ice, " Compression, Elevation  -Limited weight bearing for 2-3 days;  and/or weight bearing as tolerated. Use crutches until able to walk with normal gait. Has her own crutches.   -Ice 15 to 20 minutes every two to three hours for the first 48 hours or until swelling is improved.  -Compression with an elastic bandage for swelling.  -Range of motion exercises.     Follow up with PCP. Follow up emergently for severe uncontrolled pain, neurovascular compromise (decreased sensation, motion, or circulation). Follow up if symptoms persist for more than six to eight weeks.    -No fracture noted on imaging. Distal neurovascular is intact. Discussed initial measures for pain and inflammation. With f/u for persistence.     Differential diagnosis, natural history, supportive care, and indications for immediate follow-up discussed.

## 2024-05-18 DIAGNOSIS — Z30.41 ENCOUNTER FOR SURVEILLANCE OF CONTRACEPTIVE PILLS: ICD-10-CM

## 2024-05-20 RX ORDER — DROSPIRENONE AND ETHINYL ESTRADIOL TABLETS 0.02-3(28)
1 KIT ORAL DAILY
Qty: 84 TABLET | Refills: 3 | Status: SHIPPED | OUTPATIENT
Start: 2024-05-20

## 2024-07-17 ENCOUNTER — APPOINTMENT (OUTPATIENT)
Dept: MEDICAL GROUP | Facility: IMAGING CENTER | Age: 29
End: 2024-07-17
Payer: COMMERCIAL

## 2024-07-17 VITALS
DIASTOLIC BLOOD PRESSURE: 80 MMHG | RESPIRATION RATE: 18 BRPM | TEMPERATURE: 97.4 F | HEIGHT: 64 IN | WEIGHT: 168.8 LBS | SYSTOLIC BLOOD PRESSURE: 120 MMHG | HEART RATE: 78 BPM | BODY MASS INDEX: 28.82 KG/M2 | OXYGEN SATURATION: 93 %

## 2024-07-17 DIAGNOSIS — Z11.4 ENCOUNTER FOR SCREENING FOR HUMAN IMMUNODEFICIENCY VIRUS (HIV): ICD-10-CM

## 2024-07-17 DIAGNOSIS — R53.82 CHRONIC FATIGUE: ICD-10-CM

## 2024-07-17 DIAGNOSIS — Z11.59 NEED FOR HEPATITIS C SCREENING TEST: ICD-10-CM

## 2024-07-17 DIAGNOSIS — Z00.00 WELLNESS EXAMINATION: ICD-10-CM

## 2024-07-17 DIAGNOSIS — F41.9 ANXIETY AND DEPRESSION: ICD-10-CM

## 2024-07-17 DIAGNOSIS — R03.0 ELEVATED BLOOD PRESSURE READING: ICD-10-CM

## 2024-07-17 DIAGNOSIS — F32.A ANXIETY AND DEPRESSION: ICD-10-CM

## 2024-07-17 PROCEDURE — 99214 OFFICE O/P EST MOD 30 MIN: CPT

## 2024-07-25 ENCOUNTER — HOSPITAL ENCOUNTER (OUTPATIENT)
Dept: LAB | Facility: MEDICAL CENTER | Age: 29
End: 2024-07-25
Payer: COMMERCIAL

## 2024-07-25 DIAGNOSIS — F41.9 ANXIETY AND DEPRESSION: ICD-10-CM

## 2024-07-25 DIAGNOSIS — Z11.59 NEED FOR HEPATITIS C SCREENING TEST: ICD-10-CM

## 2024-07-25 DIAGNOSIS — F32.A ANXIETY AND DEPRESSION: ICD-10-CM

## 2024-07-25 DIAGNOSIS — R53.82 CHRONIC FATIGUE: ICD-10-CM

## 2024-07-25 DIAGNOSIS — Z11.4 ENCOUNTER FOR SCREENING FOR HUMAN IMMUNODEFICIENCY VIRUS (HIV): ICD-10-CM

## 2024-07-25 DIAGNOSIS — Z00.00 WELLNESS EXAMINATION: ICD-10-CM

## 2024-07-25 LAB
25(OH)D3 SERPL-MCNC: 46 NG/ML (ref 30–100)
ALBUMIN SERPL BCP-MCNC: 3.9 G/DL (ref 3.2–4.9)
ALBUMIN/GLOB SERPL: 1.2 G/DL
ALP SERPL-CCNC: 70 U/L (ref 30–99)
ALT SERPL-CCNC: 7 U/L (ref 2–50)
ANION GAP SERPL CALC-SCNC: 11 MMOL/L (ref 7–16)
AST SERPL-CCNC: 15 U/L (ref 12–45)
BASOPHILS # BLD AUTO: 0.9 % (ref 0–1.8)
BASOPHILS # BLD: 0.08 K/UL (ref 0–0.12)
BILIRUB SERPL-MCNC: 0.6 MG/DL (ref 0.1–1.5)
BUN SERPL-MCNC: 15 MG/DL (ref 8–22)
CALCIUM ALBUM COR SERPL-MCNC: 9.4 MG/DL (ref 8.5–10.5)
CALCIUM SERPL-MCNC: 9.3 MG/DL (ref 8.5–10.5)
CHLORIDE SERPL-SCNC: 105 MMOL/L (ref 96–112)
CHOLEST SERPL-MCNC: 224 MG/DL (ref 100–199)
CO2 SERPL-SCNC: 22 MMOL/L (ref 20–33)
CREAT SERPL-MCNC: 1.01 MG/DL (ref 0.5–1.4)
EOSINOPHIL # BLD AUTO: 0.21 K/UL (ref 0–0.51)
EOSINOPHIL NFR BLD: 2.3 % (ref 0–6.9)
ERYTHROCYTE [DISTWIDTH] IN BLOOD BY AUTOMATED COUNT: 44.6 FL (ref 35.9–50)
EST. AVERAGE GLUCOSE BLD GHB EST-MCNC: 100 MG/DL
FASTING STATUS PATIENT QL REPORTED: NORMAL
GFR SERPLBLD CREATININE-BSD FMLA CKD-EPI: 78 ML/MIN/1.73 M 2
GLOBULIN SER CALC-MCNC: 3.2 G/DL (ref 1.9–3.5)
GLUCOSE SERPL-MCNC: 81 MG/DL (ref 65–99)
HBA1C MFR BLD: 5.1 % (ref 4–5.6)
HCT VFR BLD AUTO: 44.1 % (ref 37–47)
HCV AB SER QL: NORMAL
HDLC SERPL-MCNC: 72 MG/DL
HGB BLD-MCNC: 14.2 G/DL (ref 12–16)
HIV 1+2 AB+HIV1 P24 AG SERPL QL IA: NORMAL
IMM GRANULOCYTES # BLD AUTO: 0.02 K/UL (ref 0–0.11)
IMM GRANULOCYTES NFR BLD AUTO: 0.2 % (ref 0–0.9)
LDLC SERPL CALC-MCNC: 137 MG/DL
LYMPHOCYTES # BLD AUTO: 2.91 K/UL (ref 1–4.8)
LYMPHOCYTES NFR BLD: 31.3 % (ref 22–41)
MCH RBC QN AUTO: 29.3 PG (ref 27–33)
MCHC RBC AUTO-ENTMCNC: 32.2 G/DL (ref 32.2–35.5)
MCV RBC AUTO: 91.1 FL (ref 81.4–97.8)
MONOCYTES # BLD AUTO: 0.57 K/UL (ref 0–0.85)
MONOCYTES NFR BLD AUTO: 6.1 % (ref 0–13.4)
NEUTROPHILS # BLD AUTO: 5.5 K/UL (ref 1.82–7.42)
NEUTROPHILS NFR BLD: 59.2 % (ref 44–72)
NRBC # BLD AUTO: 0 K/UL
NRBC BLD-RTO: 0 /100 WBC (ref 0–0.2)
PLATELET # BLD AUTO: 375 K/UL (ref 164–446)
PMV BLD AUTO: 10.2 FL (ref 9–12.9)
POTASSIUM SERPL-SCNC: 4.4 MMOL/L (ref 3.6–5.5)
PROT SERPL-MCNC: 7.1 G/DL (ref 6–8.2)
RBC # BLD AUTO: 4.84 M/UL (ref 4.2–5.4)
SODIUM SERPL-SCNC: 138 MMOL/L (ref 135–145)
TRIGL SERPL-MCNC: 75 MG/DL (ref 0–149)
TSH SERPL DL<=0.005 MIU/L-ACNC: 1.56 UIU/ML (ref 0.38–5.33)
WBC # BLD AUTO: 9.3 K/UL (ref 4.8–10.8)

## 2024-07-25 PROCEDURE — 86803 HEPATITIS C AB TEST: CPT

## 2024-07-25 PROCEDURE — 80053 COMPREHEN METABOLIC PANEL: CPT

## 2024-07-25 PROCEDURE — 87389 HIV-1 AG W/HIV-1&-2 AB AG IA: CPT

## 2024-07-25 PROCEDURE — 82306 VITAMIN D 25 HYDROXY: CPT

## 2024-07-25 PROCEDURE — 80061 LIPID PANEL: CPT

## 2024-07-25 PROCEDURE — 85025 COMPLETE CBC W/AUTO DIFF WBC: CPT

## 2024-07-25 PROCEDURE — 36415 COLL VENOUS BLD VENIPUNCTURE: CPT

## 2024-07-25 PROCEDURE — 84443 ASSAY THYROID STIM HORMONE: CPT

## 2024-07-25 PROCEDURE — 83036 HEMOGLOBIN GLYCOSYLATED A1C: CPT

## 2024-07-31 ENCOUNTER — HOSPITAL ENCOUNTER (OUTPATIENT)
Dept: LAB | Facility: MEDICAL CENTER | Age: 29
End: 2024-07-31
Payer: COMMERCIAL

## 2024-07-31 LAB — CORTIS SERPL-MCNC: 42.5 UG/DL (ref 0–23)

## 2024-07-31 PROCEDURE — 82533 TOTAL CORTISOL: CPT

## 2024-07-31 PROCEDURE — 36415 COLL VENOUS BLD VENIPUNCTURE: CPT

## 2024-08-01 ENCOUNTER — APPOINTMENT (OUTPATIENT)
Dept: MEDICAL GROUP | Facility: IMAGING CENTER | Age: 29
End: 2024-08-01
Payer: COMMERCIAL

## 2024-08-01 VITALS
SYSTOLIC BLOOD PRESSURE: 114 MMHG | HEART RATE: 79 BPM | BODY MASS INDEX: 27.93 KG/M2 | WEIGHT: 163.6 LBS | TEMPERATURE: 97.6 F | OXYGEN SATURATION: 97 % | DIASTOLIC BLOOD PRESSURE: 80 MMHG | HEIGHT: 64 IN | RESPIRATION RATE: 16 BRPM

## 2024-08-01 DIAGNOSIS — E78.00 PURE HYPERCHOLESTEROLEMIA: ICD-10-CM

## 2024-08-01 DIAGNOSIS — R79.89 ELEVATED CORTISOL LEVEL: ICD-10-CM

## 2024-08-01 PROCEDURE — 99213 OFFICE O/P EST LOW 20 MIN: CPT

## 2024-08-01 PROCEDURE — 3079F DIAST BP 80-89 MM HG: CPT

## 2024-08-01 PROCEDURE — 3074F SYST BP LT 130 MM HG: CPT

## 2024-08-01 ASSESSMENT — FIBROSIS 4 INDEX: FIB4 SCORE: 0.39

## 2024-08-01 NOTE — PROGRESS NOTES
Subjective:     CC:   Chief Complaint   Patient presents with    Follow-Up     Lab results 7/25 and   7/31       HPI:   Jackelyn presents today to discuss:    Pure hypercholesterolemia  New finding.   Diet: admits diet could improve, avoid fried food  Exercise: active at work, on her feet walking during shift, tries to go 2-3 times a week to Pilates    Cardiovascular Risk Factors:  1. Smoking status: no  2. Type II Diabetes Mellitus: no  3. Hypertension: no  4. Dyslipidemia: no  5. Family history of early Coronary Artery Disease in a first degree relative (Male less than 55 years of age; Female less than 65 years of age): no  6.  Obesity and/or Metabolic Syndrome: no  7. Sedentary lifestyle: no  Patient denies blurred blurred, severe headaches, chest pain, chest pressure, dizziness, palpitations, syncope, orthopnea, dyspnea or exertion, or leg swelling.         Latest Reference Range & Units 07/25/24 14:14   Cholesterol,Tot 100 - 199 mg/dL 224 (H)   Triglycerides 0 - 149 mg/dL 75   HDL >=40 mg/dL 72   LDL <100 mg/dL 137 (H)     Elevated cortisol level  New Finding.  Patient admits having personal history of anxiety and depression and chronic fatigue.  She reports having increased stress at work which is a contributing factor.  She is scheduled to take all of September off from work to help with anxiety.      Latest Reference Range & Units 07/31/24 07:04   Cortisol 0.0 - 23.0 ug/dL 42.5 (H)         History reviewed. No pertinent past medical history.  History reviewed. No pertinent family history.  Past Surgical History:   Procedure Laterality Date    KNEE ARTHROSCOPY Left     2020     Social History     Tobacco Use    Smoking status: Never    Smokeless tobacco: Never   Vaping Use    Vaping status: Never Used   Substance Use Topics    Alcohol use: Yes     Comment: socially    Drug use: Never     Social History     Social History Narrative    Not on file     Current Outpatient Medications Ordered in Epic   Medication  "Sig Dispense Refill    LORYNA 3-0.02 MG per tablet Take 1 Tablet by mouth every day. 84 Tablet 3    ibuprofen (MOTRIN) 800 MG Tab TAKE 1 TABLET BY ORAL ROUTE EVERY 8 HOURS AS NEEDED POST OP PAIN      buPROPion (WELLBUTRIN XL) 150 MG XL tablet Take 1 Tablet by mouth every day. 90 Tablet 3    buPROPion (WELLBUTRIN XL) 300 MG XL tablet Take 1 Tablet by mouth every day. 90 Tablet 3    ondansetron (ZOFRAN ODT) 4 MG TABLET DISPERSIBLE Take 1 Tablet by mouth every 6 hours as needed for Nausea/Vomiting for up to 15 doses. 15 Tablet 0    ibuprofen (MOTRIN) 200 MG Tab Take 200 mg by mouth every 6 hours as needed.       No current Epic-ordered facility-administered medications on file.     Patient has no known allergies.    ROS: see hpi  Gen: no fevers/chills  Pulm: no sob, no cough  CV: no chest pain, no palpitations, no edema  GI: no nausea/vomiting, no diarrhea  Skin: no rash    Objective:   Exam:  /80 (BP Location: Left arm, Patient Position: Sitting, BP Cuff Size: Adult)   Pulse 79   Temp 36.4 °C (97.6 °F) (Temporal)   Resp 16   Ht 1.626 m (5' 4\")   Wt 74.2 kg (163 lb 9.6 oz)   LMP 07/13/2024   SpO2 97%   BMI 28.08 kg/m²    Body mass index is 28.08 kg/m².    Gen: Alert and oriented, No apparent distress.  HEENT: Head atraumatic, normocephalic. Pupils equal and round.  Neck: Neck is supple without lymphadenopathy.   Lungs: Normal effort, CTA bilaterally, no wheezes, rhonchi, or rales  CV: Regular rate and rhythm. No murmurs, rubs, or gallops.  ABD: +BS. Non-tender, non-distended. No rebound, rigidity, or guarding.  Ext: No clubbing, cyanosis, edema.    Assessment & Plan:     28 y.o. female with the following -     1. Pure hypercholesterolemia  New finding. Discussed LDL goal <100 to reduce risk of heart attacks and stroke.  Recommend lifestyle modification discussed includes diet and reduction of sodium. Patients should eat more of a Mediterranean diet and be very careful with how much processed and fast food " they eat. Excessive sodium intake can result in fluid retention which can add additional strain to patients cardiac electrical system. Weight loss can also help long term with cardiac health. For patients with BMI above 25kg/m2, studies have shown a greater chance of progression of disease as well as recurrence after interventions. Smoking and vaping should be stopped. Moderation on caffeine and alcohol. Excessive alcohol or caffeine can result in reactions and antagonize the hearts electrical system. Patient that fit the matrix for sleep apnea should be referred for a formal study and should try to be compliant with treatment for sleep apnea. Stay hydrated and stay active. Studies have shown that staying physically active can help heart health. The CARDIO-FIT study showed sedentary individuals lead to faster time of recurrence of arrhythmia. Exercise goals should be trying to maintain 120-200 minutes per week of exercise.   Will monitor.   Ed symptoms discussed.    2. Elevated cortisol level  New finding. Likely stress induced. Will repeat lab in 1-2 months to confirm and r/o cushing's syndrome as differential dx.     - ACTH; Future  - URINE CORTISOL 24 HR, ICMA; Future  - CORTISOL - AM    Medical Decision Making/Course:  In the course of preparing for this visit with review of the pertinent past medical history, recent and past clinic visits, current medications, and performing chart, immunization, medical history and medication reconciliation, and in the further course of obtaining the current history pertinent to the clinic visit today, performing an exam and evaluation, ordering and independently evaluating labs, tests, and/or procedures, prescribing any recommended new medications as noted above, providing any pertinent counseling and education and recommending further coordination of care. This was discussed with patient in a shared-decision making conversation, and they understand and agreed with plan of  care.     Return in about 6 weeks (around 9/12/2024), or if symptoms worsen or fail to improve, for f/u labs.    JEREMIE Mar   [facility]    Please note that this dictation was created using voice recognition software. I have made every reasonable attempt to correct obvious errors, but I expect that there are errors of grammar and possibly content that I did not discover before finalizing the note.

## 2024-09-12 DIAGNOSIS — F41.9 ANXIETY AND DEPRESSION: ICD-10-CM

## 2024-09-12 DIAGNOSIS — F32.A ANXIETY AND DEPRESSION: ICD-10-CM

## 2024-09-12 RX ORDER — BUPROPION HYDROCHLORIDE 150 MG/1
150 TABLET ORAL DAILY
Qty: 90 TABLET | Refills: 3 | Status: SHIPPED | OUTPATIENT
Start: 2024-09-12

## 2024-09-12 RX ORDER — BUPROPION HYDROCHLORIDE 300 MG/1
300 TABLET ORAL DAILY
Qty: 90 TABLET | Refills: 3 | Status: SHIPPED | OUTPATIENT
Start: 2024-09-12

## 2024-09-12 NOTE — TELEPHONE ENCOUNTER
Received request via: Pharmacy    Was the patient seen in the last year in this department? Yes    Does the patient have an active prescription (recently filled or refills available) for medication(s) requested? No    Pharmacy Name: [id number]    Does the patient have snf Plus and need 100-day supply? (This applies to ALL medications) Patient does not have SCP

## 2024-09-30 ENCOUNTER — IMMUNIZATION (OUTPATIENT)
Dept: OCCUPATIONAL MEDICINE | Facility: CLINIC | Age: 29
End: 2024-09-30

## 2024-09-30 DIAGNOSIS — Z23 NEED FOR VACCINATION: Primary | ICD-10-CM

## 2024-10-04 PROCEDURE — 90656 IIV3 VACC NO PRSV 0.5 ML IM: CPT | Performed by: NURSE PRACTITIONER

## 2025-01-17 ENCOUNTER — EH NON-PROVIDER (OUTPATIENT)
Dept: OCCUPATIONAL MEDICINE | Facility: CLINIC | Age: 30
End: 2025-01-17

## 2025-01-17 DIAGNOSIS — Z02.89 ENCOUNTER FOR OCCUPATIONAL HEALTH EXAMINATION INVOLVING RESPIRATOR: ICD-10-CM

## 2025-01-17 PROCEDURE — 94375 RESPIRATORY FLOW VOLUME LOOP: CPT | Performed by: NURSE PRACTITIONER

## 2025-06-29 DIAGNOSIS — Z30.41 ENCOUNTER FOR SURVEILLANCE OF CONTRACEPTIVE PILLS: ICD-10-CM

## 2025-06-30 RX ORDER — DROSPIRENONE AND ETHINYL ESTRADIOL TABLETS 0.02-3(28)
1 KIT ORAL DAILY
Qty: 84 TABLET | Refills: 0 | Status: SHIPPED | OUTPATIENT
Start: 2025-06-30

## 2025-06-30 NOTE — TELEPHONE ENCOUNTER
Received request via: Pharmacy    Was the patient seen in the last year in this department? No    Does the patient have an active prescription (recently filled or refills available) for medication(s) requested? No    Pharmacy Name: [id number]    Does the patient have FCI Plus and need 100-day supply? (This applies to ALL medications) Patient does not have SCP

## 2025-08-22 DIAGNOSIS — F32.A ANXIETY AND DEPRESSION: ICD-10-CM

## 2025-08-22 DIAGNOSIS — F41.9 ANXIETY AND DEPRESSION: ICD-10-CM

## 2025-08-25 RX ORDER — BUPROPION HYDROCHLORIDE 150 MG/1
150 TABLET ORAL DAILY
Qty: 90 TABLET | Refills: 3 | Status: SHIPPED | OUTPATIENT
Start: 2025-08-25

## 2025-08-25 RX ORDER — BUPROPION HYDROCHLORIDE 300 MG/1
300 TABLET ORAL DAILY
Qty: 90 TABLET | Refills: 3 | Status: SHIPPED | OUTPATIENT
Start: 2025-08-25